# Patient Record
Sex: MALE | Race: WHITE | NOT HISPANIC OR LATINO | Employment: FULL TIME | URBAN - METROPOLITAN AREA
[De-identification: names, ages, dates, MRNs, and addresses within clinical notes are randomized per-mention and may not be internally consistent; named-entity substitution may affect disease eponyms.]

---

## 2022-10-28 ENCOUNTER — HOSPITAL ENCOUNTER (OUTPATIENT)
Dept: GENERAL RADIOLOGY | Facility: HOSPITAL | Age: 32
Discharge: HOME | End: 2022-10-28

## 2022-10-28 ENCOUNTER — LAB (OUTPATIENT)
Dept: LAB | Facility: HOSPITAL | Age: 32
End: 2022-10-28

## 2022-10-28 ENCOUNTER — OFFICE VISIT (OUTPATIENT)
Dept: INTERNAL MEDICINE | Facility: CLINIC | Age: 32
End: 2022-10-28

## 2022-10-28 VITALS
RESPIRATION RATE: 16 BRPM | OXYGEN SATURATION: 99 % | SYSTOLIC BLOOD PRESSURE: 140 MMHG | WEIGHT: 247 LBS | DIASTOLIC BLOOD PRESSURE: 92 MMHG | HEART RATE: 87 BPM | BODY MASS INDEX: 34.58 KG/M2 | HEIGHT: 71 IN | TEMPERATURE: 97.4 F

## 2022-10-28 DIAGNOSIS — N48.5 PENILE ULCER: ICD-10-CM

## 2022-10-28 DIAGNOSIS — Z13.6 SCREENING FOR ISCHEMIC HEART DISEASE: ICD-10-CM

## 2022-10-28 DIAGNOSIS — Z00.00 ROUTINE HEALTH MAINTENANCE: Primary | ICD-10-CM

## 2022-10-28 DIAGNOSIS — Z00.00 ROUTINE HEALTH MAINTENANCE: ICD-10-CM

## 2022-10-28 DIAGNOSIS — G89.29 CHRONIC LEFT SHOULDER PAIN: ICD-10-CM

## 2022-10-28 DIAGNOSIS — M25.512 CHRONIC LEFT SHOULDER PAIN: ICD-10-CM

## 2022-10-28 DIAGNOSIS — I10 PRIMARY HYPERTENSION: ICD-10-CM

## 2022-10-28 DIAGNOSIS — Z23 ENCOUNTER FOR VACCINATION: ICD-10-CM

## 2022-10-28 LAB
ALBUMIN SERPL-MCNC: 4.5 G/DL (ref 3.5–5.2)
ALBUMIN/GLOB SERPL: 1.5 G/DL
ALP SERPL-CCNC: 97 U/L (ref 39–117)
ALT SERPL W P-5'-P-CCNC: 58 U/L (ref 1–41)
ANION GAP SERPL CALCULATED.3IONS-SCNC: 10.5 MMOL/L (ref 5–15)
AST SERPL-CCNC: 28 U/L (ref 1–40)
BASOPHILS # BLD AUTO: 0.05 10*3/MM3 (ref 0–0.2)
BASOPHILS NFR BLD AUTO: 0.5 % (ref 0–1.5)
BILIRUB SERPL-MCNC: 1.4 MG/DL (ref 0–1.2)
BUN SERPL-MCNC: 8 MG/DL (ref 6–20)
BUN/CREAT SERPL: 8.8 (ref 7–25)
CALCIUM SPEC-SCNC: 9.2 MG/DL (ref 8.6–10.5)
CHLORIDE SERPL-SCNC: 103 MMOL/L (ref 98–107)
CHOLEST SERPL-MCNC: 177 MG/DL (ref 0–200)
CO2 SERPL-SCNC: 24.5 MMOL/L (ref 22–29)
CREAT SERPL-MCNC: 0.91 MG/DL (ref 0.76–1.27)
DEPRECATED RDW RBC AUTO: 41 FL (ref 37–54)
EGFRCR SERPLBLD CKD-EPI 2021: 114.8 ML/MIN/1.73
EOSINOPHIL # BLD AUTO: 0.15 10*3/MM3 (ref 0–0.4)
EOSINOPHIL NFR BLD AUTO: 1.5 % (ref 0.3–6.2)
ERYTHROCYTE [DISTWIDTH] IN BLOOD BY AUTOMATED COUNT: 12.6 % (ref 12.3–15.4)
GLOBULIN UR ELPH-MCNC: 3 GM/DL
GLUCOSE SERPL-MCNC: 74 MG/DL (ref 65–99)
HCT VFR BLD AUTO: 49.8 % (ref 37.5–51)
HDLC SERPL-MCNC: 29 MG/DL (ref 40–60)
HGB BLD-MCNC: 17 G/DL (ref 13–17.7)
IMM GRANULOCYTES # BLD AUTO: 0.05 10*3/MM3 (ref 0–0.05)
IMM GRANULOCYTES NFR BLD AUTO: 0.5 % (ref 0–0.5)
LDLC SERPL CALC-MCNC: 114 MG/DL (ref 0–100)
LDLC/HDLC SERPL: 3.79 {RATIO}
LYMPHOCYTES # BLD AUTO: 3.07 10*3/MM3 (ref 0.7–3.1)
LYMPHOCYTES NFR BLD AUTO: 30.8 % (ref 19.6–45.3)
MCH RBC QN AUTO: 30.1 PG (ref 26.6–33)
MCHC RBC AUTO-ENTMCNC: 34.1 G/DL (ref 31.5–35.7)
MCV RBC AUTO: 88.1 FL (ref 79–97)
MONOCYTES # BLD AUTO: 0.67 10*3/MM3 (ref 0.1–0.9)
MONOCYTES NFR BLD AUTO: 6.7 % (ref 5–12)
NEUTROPHILS NFR BLD AUTO: 5.98 10*3/MM3 (ref 1.7–7)
NEUTROPHILS NFR BLD AUTO: 60 % (ref 42.7–76)
NRBC BLD AUTO-RTO: 0 /100 WBC (ref 0–0.2)
PLATELET # BLD AUTO: 279 10*3/MM3 (ref 140–450)
PMV BLD AUTO: 12.1 FL (ref 6–12)
POTASSIUM SERPL-SCNC: 4.4 MMOL/L (ref 3.5–5.2)
PROT SERPL-MCNC: 7.5 G/DL (ref 6–8.5)
RBC # BLD AUTO: 5.65 10*6/MM3 (ref 4.14–5.8)
SODIUM SERPL-SCNC: 138 MMOL/L (ref 136–145)
TRIGL SERPL-MCNC: 190 MG/DL (ref 0–150)
VLDLC SERPL-MCNC: 34 MG/DL (ref 5–40)
WBC NRBC COR # BLD: 9.97 10*3/MM3 (ref 3.4–10.8)

## 2022-10-28 PROCEDURE — 86695 HERPES SIMPLEX TYPE 1 TEST: CPT

## 2022-10-28 PROCEDURE — 86696 HERPES SIMPLEX TYPE 2 TEST: CPT

## 2022-10-28 PROCEDURE — 73030 X-RAY EXAM OF SHOULDER: CPT

## 2022-10-28 PROCEDURE — 85025 COMPLETE CBC W/AUTO DIFF WBC: CPT

## 2022-10-28 PROCEDURE — 80061 LIPID PANEL: CPT

## 2022-10-28 PROCEDURE — 99204 OFFICE O/P NEW MOD 45 MIN: CPT | Performed by: STUDENT IN AN ORGANIZED HEALTH CARE EDUCATION/TRAINING PROGRAM

## 2022-10-28 PROCEDURE — 80053 COMPREHEN METABOLIC PANEL: CPT

## 2022-10-28 NOTE — PATIENT INSTRUCTIONS
Health Maintenance, Male  A healthy lifestyle and preventive care is important for your health and wellness. Ask your health care provider about what schedule of regular examinations is right for you.  What should I know about weight and diet?    Eat a Healthy Diet  Eat plenty of vegetables, fruits, whole grains, low-fat dairy products, and lean protein.  Do not eat a lot of foods high in solid fats, added sugars, or salt.     Maintain a Healthy Weight  Regular exercise can help you achieve or maintain a healthy weight. You should:  Do at least 150 minutes of exercise each week. The exercise should increase your heart rate and make you sweat (moderate-intensity exercise).  Do strength-training exercises at least twice a week.     Watch Your Levels of Cholesterol and Blood Lipids  Have your blood tested for lipids and cholesterol every 5 years starting at 35 years of age. If you are at high risk for heart disease, you should start having your blood tested when you are 20 years old. You may need to have your cholesterol levels checked more often if:  Your lipid or cholesterol levels are high.  You are older than 50 years of age.  You are at high risk for heart disease.     What should I know about cancer screening?  Many types of cancers can be detected early and may often be prevented.  Lung Cancer  You should be screened every year for lung cancer if:  You are a current smoker who has smoked for at least 30 years.  You are a former smoker who has quit within the past 15 years.  Talk to your health care provider about your screening options, when you should start screening, and how often you should be screened.     Colorectal Cancer  Routine colorectal cancer screening usually begins at 50 years of age and should be repeated every 5-10 years until you are 75 years old. You may need to be screened more often if early forms of precancerous polyps or small growths are found. Your health care provider may recommend  screening at an earlier age if you have risk factors for colon cancer.  Your health care provider may recommend using home test kits to check for hidden blood in the stool.  A small camera at the end of a tube can be used to examine your colon (sigmoidoscopy or colonoscopy). This checks for the earliest forms of colorectal cancer.     Prostate and Testicular Cancer  Depending on your age and overall health, your health care provider may do certain tests to screen for prostate and testicular cancer.  Talk to your health care provider about any symptoms or concerns you have about testicular or prostate cancer.     Skin Cancer  Check your skin from head to toe regularly.  Tell your health care provider about any new moles or changes in moles, especially if:  There is a change in a mole’s size, shape, or color.  You have a mole that is larger than a pencil eraser.  Always use sunscreen. Apply sunscreen liberally and repeat throughout the day.  Protect yourself by wearing long sleeves, pants, a wide-brimmed hat, and sunglasses when outside.     What should I know about heart disease, diabetes, and high blood pressure?  If you are 18-39 years of age, have your blood pressure checked every 3-5 years. If you are 40 years of age or older, have your blood pressure checked every year. You should have your blood pressure measured twice--once when you are at a hospital or clinic, and once when you are not at a hospital or clinic. Record the average of the two measurements. To check your blood pressure when you are not at a hospital or clinic, you can use:  An automated blood pressure machine at a pharmacy.  A home blood pressure monitor.  Talk to your health care provider about your target blood pressure.  If you are between 45-79 years old, ask your health care provider if you should take aspirin to prevent heart disease.  Have regular diabetes screenings by checking your fasting blood sugar level.  If you are at a normal  weight and have a low risk for diabetes, have this test once every three years after the age of 45.  If you are overweight and have a high risk for diabetes, consider being tested at a younger age or more often.  A one-time screening for abdominal aortic aneurysm (AAA) by ultrasound is recommended for men aged 65-75 years who are current or former smokers.  What should I know about preventing infection?  Hepatitis B  If you have a higher risk for hepatitis B, you should be screened for this virus. Talk with your health care provider to find out if you are at risk for hepatitis B infection.  Hepatitis C  Blood testing is recommended for:  Everyone born from 1945 through 1965.  Anyone with known risk factors for hepatitis C.     Sexually Transmitted Diseases (STDs)  You should be screened each year for STDs including gonorrhea and chlamydia if:  You are sexually active and are younger than 24 years of age.  You are older than 24 years of age and your health care provider tells you that you are at risk for this type of infection.  Your sexual activity has changed since you were last screened and you are at an increased risk for chlamydia or gonorrhea. Ask your health care provider if you are at risk.  Talk with your health care provider about whether you are at high risk of being infected with HIV. Your health care provider may recommend a prescription medicine to help prevent HIV infection.     What else can I do?    Schedule regular health, dental, and eye exams.  Stay current with your vaccines (immunizations).  Do not use any tobacco products, such as cigarettes, chewing tobacco, and e-cigarettes. If you need help quitting, ask your health care provider.  Limit alcohol intake to no more than 2 drinks per day. One drink equals 12 ounces of beer, 5 ounces of wine, or 1½ ounces of hard liquor.  Do not use street drugs.  Do not share needles.  Ask your health care provider for help if you need support or information  about quitting drugs.  Tell your health care provider if you often feel depressed.  Tell your health care provider if you have ever been abused or do not feel safe at home.      This information is not intended to replace advice given to you by your health care provider. Make sure you discuss any questions you have with your health care provider.  Document Released: 06/15/2009 Document Revised: 08/16/2017 Document Reviewed: 09/20/2016  [employer] Patient Education © 2018 [employer]    Healthy Eating  Following a healthy eating pattern may help you to achieve and maintain a healthy body weight, reduce the risk of chronic disease, and live a long and productive life. It is important to follow a healthy eating pattern at an appropriate calorie level for your body. Your nutritional needs should be met primarily through food by choosing a variety of nutrient-rich foods.  What are tips for following this plan?  Reading food labels  Read labels and choose the following:  Reduced or low sodium.  Juices with 100% fruit juice.  Foods with low saturated fats and high polyunsaturated and monounsaturated fats.  Foods with whole grains, such as whole wheat, cracked wheat, brown rice, and wild rice.  Whole grains that are fortified with folic acid. This is recommended for women who are pregnant or who want to become pregnant.  Read labels and avoid the following:  Foods with a lot of added sugars. These include foods that contain brown sugar, corn sweetener, corn syrup, dextrose, fructose, glucose, high-fructose corn syrup, honey, invert sugar, lactose, malt syrup, maltose, molasses, raw sugar, sucrose, trehalose, or turbinado sugar.  Do not eat more than the following amounts of added sugar per day:  6 teaspoons (25 g) for women.  9 teaspoons (38 g) for men.  Foods that contain processed or refined starches and grains.  Refined grain products, such as white flour, degermed cornmeal, white bread, and white  "rice.  Shopping  Choose nutrient-rich snacks, such as vegetables, whole fruits, and nuts. Avoid high-calorie and high-sugar snacks, such as potato chips, fruit snacks, and candy.  Use oil-based dressings and spreads on foods instead of solid fats such as butter, stick margarine, or cream cheese.  Limit pre-made sauces, mixes, and \"instant\" products such as flavored rice, instant noodles, and ready-made pasta.  Try more plant-protein sources, such as tofu, tempeh, black beans, edamame, lentils, nuts, and seeds.  Explore eating plans such as the Mediterranean diet or vegetarian diet.  Cooking  Use oil to sauté or stir-khoury foods instead of solid fats such as butter, stick margarine, or lard.  Try baking, boiling, grilling, or broiling instead of frying.  Remove the fatty part of meats before cooking.  Steam vegetables in water or broth.  Meal planning    At meals, imagine dividing your plate into fourths:  One-half of your plate is fruits and vegetables.  One-fourth of your plate is whole grains.  One-fourth of your plate is protein, especially lean meats, poultry, eggs, tofu, beans, or nuts.  Include low-fat dairy as part of your daily diet.     Lifestyle  Choose healthy options in all settings, including home, work, school, restaurants, or stores.  Prepare your food safely:  Wash your hands after handling raw meats.  Keep food preparation surfaces clean by regularly washing with hot, soapy water.  Keep raw meats separate from ready-to-eat foods, such as fruits and vegetables.  , meat, poultry, and eggs to the recommended internal temperature.  Store foods at safe temperatures. In general:  Keep cold foods at 40°F (4.4°C) or below.  Keep hot foods at 140°F (60°C) or above.  Keep your freezer at 0°F (-17.8°C) or below.  Foods are no longer safe to eat when they have been between the temperatures of 40°-140°F (4.4-60°C) for more than 2 hours.  What foods should I eat?  Fruits  Aim to eat 2 cup-equivalents of " fresh, canned (in natural juice), or frozen fruits each day. Examples of 1 cup-equivalent of fruit include 1 small apple, 8 large strawberries, 1 cup canned fruit, ½ cup dried fruit, or 1 cup 100% juice.  Vegetables  Aim to eat 2½-3 cup-equivalents of fresh and frozen vegetables each day, including different varieties and colors. Examples of 1 cup-equivalent of vegetables include 2 medium carrots, 2 cups raw, leafy greens, 1 cup chopped vegetable (raw or cooked), or 1 medium baked potato.  Grains  Aim to eat 6 ounce-equivalents of whole grains each day. Examples of 1 ounce-equivalent of grains include 1 slice of bread, 1 cup ready-to-eat cereal, 3 cups popcorn, or ½ cup cooked rice, pasta, or cereal.  Meats and other proteins  Aim to eat 5-6 ounce-equivalents of protein each day. Examples of 1 ounce-equivalent of protein include 1 egg, 1/2 cup nuts or seeds, or 1 tablespoon (16 g) peanut butter. A cut of meat or fish that is the size of a deck of cards is about 3-4 ounce-equivalents.  Of the protein you eat each week, try to have at least 8 ounces come from seafood. This includes salmon, trout, herring, and anchovies.  Dairy  Aim to eat 3 cup-equivalents of fat-free or low-fat dairy each day. Examples of 1 cup-equivalent of dairy include 1 cup (240 mL) milk, 8 ounces (250 g) yogurt, 1½ ounces (44 g) natural cheese, or 1 cup (240 mL) fortified soy milk.  Fats and oils  Aim for about 5 teaspoons (21 g) per day. Choose monounsaturated fats, such as canola and olive oils, avocados, peanut butter, and most nuts, or polyunsaturated fats, such as sunflower, corn, and soybean oils, walnuts, pine nuts, sesame seeds, sunflower seeds, and flaxseed.  Beverages  Aim for six 8-oz glasses of water per day. Limit coffee to three to five 8-oz cups per day.  Limit caffeinated beverages that have added calories, such as soda and energy drinks.  Limit alcohol intake to no more than 1 drink a day for nonpregnant women and 2 drinks a day  for men. One drink equals 12 oz of beer (355 mL), 5 oz of wine (148 mL), or 1½ oz of hard liquor (44 mL).  Seasoning and other foods  Avoid adding excess amounts of salt to your foods. Try flavoring foods with herbs and spices instead of salt.  Avoid adding sugar to foods.  Try using oil-based dressings, sauces, and spreads instead of solid fats.  This information is based on general [place] nutrition guidelines. For more information, visit choosemyplate.gov. Exact amounts may vary based on your nutrition needs.  Summary  A healthy eating plan may help you to maintain a healthy weight, reduce the risk of chronic diseases, and stay active throughout your life.  Plan your meals. Make sure you eat the right portions of a variety of nutrient-rich foods.  Try baking, boiling, grilling, or broiling instead of frying.  Choose healthy options in all settings, including home, work, school, restaurants, or stores.  This information is not intended to replace advice given to you by your health care provider. Make sure you discuss any questions you have with your health care provider.  Document Revised: 04/01/2019 Document Reviewed: 04/01/2019  [employer] Patient Education © 2021 [employer]    Exercising to Stay Healthy  To become healthy and stay healthy, it is recommended that you do moderate-intensity and vigorous-intensity exercise. You can tell that you are exercising at a moderate intensity if your heart starts beating faster and you start breathing faster but can still hold a conversation. You can tell that you are exercising at a vigorous intensity if you are breathing much harder and faster and cannot hold a conversation while exercising.  Exercising regularly is important. It has many health benefits, such as:  Improving overall fitness, flexibility, and endurance.  Increasing bone density.  Helping with weight control.  Decreasing body fat.  Increasing muscle strength.  Reducing stress and tension.  Improving overall  health.  How often should I exercise?  Choose an activity that you enjoy, and set realistic goals. Your health care provider can help you make an activity plan that works for you.  Exercise regularly as told by your health care provider. This may include:  Doing strength training two times a week, such as:  Lifting weights.  Using resistance bands.  Push-ups.  Sit-ups.  Yoga.  Doing a certain intensity of exercise for a given amount of time. Choose from these options:  A total of 150 minutes of moderate-intensity exercise every week.  A total of 75 minutes of vigorous-intensity exercise every week.  A mix of moderate-intensity and vigorous-intensity exercise every week.  Children, pregnant women, people who have not exercised regularly, people who are overweight, and older adults may need to talk with a health care provider about what activities are safe to do. If you have a medical condition, be sure to talk with your health care provider before you start a new exercise program.  What are some exercise ideas?    Moderate-intensity exercise ideas include:  Walking 1 mile (1.6 km) in about 15 minutes.  Biking.  Hiking.  Golfing.  Dancing.  Water aerobics.  Vigorous-intensity exercise ideas include:  Walking 4.5 miles (7.2 km) or more in about 1 hour.  Jogging or running 5 miles (8 km) in about 1 hour.  Biking 10 miles (16.1 km) or more in about 1 hour.  Lap swimming.  Roller-skating or in-line skating.  Cross-country skiing.  Vigorous competitive sports, such as football, basketball, and soccer.  Jumping rope.  Aerobic dancing.  What are some everyday activities that can help me to get exercise?  Yard work, such as:  Pushing a .  Raking and bagging leaves.  Washing your car.  Pushing a stroller.  Shoveling snow.  Gardening.  Washing windows or floors.  How can I be more active in my day-to-day activities?  Use stairs instead of an elevator.  Take a walk during your lunch break.  If you drive, park your car  farther away from your work or school.  If you take public transportation, get off one stop early and walk the rest of the way.  Stand up or walk around during all of your indoor phone calls.  Get up, stretch, and walk around every 30 minutes throughout the day.  Enjoy exercise with a friend. Support to continue exercising will help you keep a regular routine of activity.  What guidelines can I follow while exercising?  Before you start a new exercise program, talk with your health care provider.  Do not exercise so much that you hurt yourself, feel dizzy, or get very short of breath.  Wear comfortable clothes and wear shoes with good support.  Drink plenty of water while you exercise to prevent dehydration or heat stroke.  Work out until your breathing and your heartbeat get faster.  Where to find more information  [place] Department of Health and Human Services: www.hhs.gov  Centers for Disease Control and Prevention (CDC): www.cdc.gov  Summary  Exercising regularly is important. It will improve your overall fitness, flexibility, and endurance.  Regular exercise also will improve your overall health. It can help you control your weight, reduce stress, and improve your bone density.  Do not exercise so much that you hurt yourself, feel dizzy, or get very short of breath.  Before you start a new exercise program, talk with your health care provider.  This information is not intended to replace advice given to you by your health care provider. Make sure you discuss any questions you have with your health care provider.  Document Revised: 11/30/2018 Document Reviewed: 11/08/2018  [employer] Patient Education © 2021 [employer]    Safe Sex  Practicing safe sex means taking steps before and during sex to reduce your risk of:  Getting an STI (sexually transmitted infection).  Giving your partner an STI.  Unwanted or unplanned pregnancy.  How to practice safe sex  Ways you can practice safe sex    Limit your sexual partners  to only one partner who is having sex with only you.  Avoid using alcohol and drugs before having sex. Alcohol and drugs can affect your judgment.  Before having sex with a new partner:  Talk to your partner about past partners, past STIs, and drug use.  Get screened for STIs and discuss the results with your partner. Ask your partner to get screened too.  Check your body regularly for sores, blisters, rashes, or unusual discharge. If you notice any of these problems, visit your health care provider.  Avoid sexual contact if you have symptoms of an infection or you are being treated for an STI.  While having sex, use a condom. Make sure to:  Use a condom every time you have vaginal, oral, or anal sex. Both females and males should wear condoms during oral sex.  Keep condoms in place from the beginning to the end of sexual activity.  Use a latex condom, if possible. Latex condoms offer the best protection.  Use only water-based lubricants with a condom. Using petroleum-based lubricants or oils will weaken the condom and increase the chance that it will break.     Ways your health care provider can help you practice safe sex    See your health care provider for regular screenings, exams, and tests for STIs.  Talk with your health care provider about what kind of birth control (contraception) is best for you.  Get vaccinated against hepatitis B and human papillomavirus (HPV).  If you are at risk of being infected with HIV (human immunodeficiency virus), talk with your health care provider about taking a prescription medicine to prevent HIV infection. You are at risk for HIV if you:  Are a man who has sex with other men.  Are sexually active with more than one partner.  Take drugs by injection.  Have a sex partner who has HIV.  Have unprotected sex.  Have sex with someone who has sex with both men and women.  Have had an STI.     Follow these instructions at home:  Take over-the-counter and prescription medicines only  as told by your health care provider.  Keep all follow-up visits. This is important.  Where to find more information  Centers for Disease Control and Prevention: www.cdc.gov  Planned Parenthood: www.plannedparenthood.org  Office on Women's Health: www.womenshealth.gov  Summary  Practicing safe sex means taking steps before and during sex to reduce your risk getting an STI, giving your partner an STI, and having an unwanted or unplanned pregnancy.  Before having sex with a new partner, talk to your partner about past partners, past STIs, and drug use.  Use a condom every time you have vaginal, oral, or anal sex. Both females and males should wear condoms during oral sex.  Check your body regularly for sores, blisters, rashes, or unusual discharge. If you notice any of these problems, visit your health care provider.  See your health care provider for regular screenings, exams, and tests for STIs.  This information is not intended to replace advice given to you by your health care provider. Make sure you discuss any questions you have with your health care provider.  Document Revised: 05/24/2021 Document Reviewed: 05/24/2021  [employer] Patient Education © 2021 [employer]          Healthy Delicious Recipes from [employer]: https://[employer].org/  [place] Plant Based Meal Recipes: https://Language Learning Class/  Heart Healthy Recipes from [employer]: https://abcardio.org/wp-content/uploads/2020/06/ABC_Cookbook.pdf  [employer] Healthy Living Guide: https://www.\[facility] [place] [facility]\"".[place].edu/nutritionsource/2022/01/06/healthy-living-guide-3846-9595/  SNAP Cookbook for Budgets: http://[employer]otechnologies.net/dss/documents/good-and-cheap.pdf

## 2022-10-28 NOTE — PROGRESS NOTES
New Patient Office Visit      Date: 10/28/2022   Patient Name: Lorenzo Cortes  : 1990   MRN: [id number]     Chief Complaint:    Chief Complaint   Patient presents with   • Establish Care   • Skin Lesion     Penile lesion   • Shoulder Pain     Lateral LT shoulder pain, numbness       History of Present Illness: Lorenzo Cortes is a 32 y.o. male who is here today to establish care.    HPI   Patient was seen in urgent care on 2022 for penile lesion.  Had HSV 1 and 2 serum antibody testing and viral culture of the lesion which were negative.  Per review these labs were obtained at [facility], HSV culture negative on 2022, chlamydia and gonorrhea urine testing negative on 2022, RPR, hepatitis C, hepatitis B and HIV all negative on 2022 as well.  Denies known exposure to STIs.  No recent partners.  Reports he had another lesion appear which she thought was an ingrown hair, which resolved within 2 days.  Is interested in repeat testing for HSV      Erectile dysfunction  Has been prescribed sildenafil in the past. Works well. Not using regularly.     Elevated liver enzymes  Per review of documentation patient has a history of elevated liver enzymes, had been advised to limit alcohol.    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  Was on medication in the past, doesn't remember medication. Was 10 years ago.  Checks at [facility] occasoinally typically 130-140/85--90.  Denies chest pain, palpitations, shortness of breath.      Cigarette Smoking  Previously smoked (17 yrs) 1ppd. Previously tried chantix.   Used nicotine gum in the past.   Currently doing fume essentials. (vaporizing no nicotine supplement)    Buldging discs  L3-L4, L5-S1.     Left shoulder pain  Patient reports he previously had a left clavicle injury following a fall in the past.  This healed and he had no residual left shoulder pain.  Over the prior 1 to 2 months he has had aching left  shoulder pain with intermittent popping.  Patient will have tingling sensation down the lateral aspect of his arm.  No weakness or numbness.  Not taking anything for the pain.    Subjective      Review of Systems:   Review of Systems    Past Medical History: History reviewed. No pertinent past medical history.    Past Surgical History:   Past Surgical History:   Procedure Laterality Date   • HERNIA REPAIR     • WISDOM TOOTH EXTRACTION         Family History:   Family History   Problem Relation Age of Onset   • Cancer Maternal Grandmother    • Cancer Maternal Grandfather    • Cancer Paternal Grandmother        Social History:   Social History     Socioeconomic History   • Marital status: Single   Tobacco Use   • Smoking status: Some Days     Types: Electronic Cigarette   • Smokeless tobacco: Never   • Tobacco comments:     I have been on and off trying to quit for about 3 months. I have a plan in place but everyone falls.   Vaping Use   • Vaping Use: Never used   Substance and Sexual Activity   • Alcohol use: Not Currently     Comment: I drink once every month of two generally   • Drug use: Yes     Frequency: 7.0 times per week     Types: Marijuana   • Sexual activity: Yes     Partners: Female     Birth control/protection: Condom       Medications:     Current Outpatient Medications:   •  fexofenadine-pseudoephedrine (ALLEGRA-D 24) 180-240 MG per 24 hr tablet, Take 1 tablet by mouth Daily As Needed for Allergies or Rhinitis., Disp: 20 tablet, Rfl: 0  •  fluticasone (FLONASE) 50 MCG/ACT nasal spray, fluticasone propionate 50 mcg/actuation nasal spray,suspension  Spray 1 spray every day by intranasal route., Disp: , Rfl:   •  sildenafil (VIAGRA) 50 MG tablet, sildenafil 50 mg tablet  Take 1 tablet every day by oral route as needed., Disp: , Rfl:   •  azelastine (ASTELIN) 0.1 % nasal spray, Every 12 (Twelve) Hours., Disp: , Rfl:     Allergies:   Allergies   Allergen Reactions   • Amoxicillin Hives   • Azithromycin Hives  "  • Cefaclor Hives       Immunizations:  Td/Tdap(Booster Q 10 yrs): 2012 ,refuses  Flu (Yearly):  refuses  Pneumonia: NA  Immunization History   Administered Date(s) Administered   • COVID-19 (PFIZER) PURPLE CAP 03/29/2021, 04/24/2021        Colorectal Screening:   Na  Last Completed Colonoscopy     This patient has no relevant Health Maintenance data.        Hep C (Age 18-79 once):  negative    A1c: NA  Lipid panel: due  The ASCVD Risk score (Giuliano GUZMAN, et al., 2019) failed to calculate for the following reasons:    The 2019 ASCVD risk score is only valid for ages 40 to 79    Ophthalmologist: regularly  Dentist: occasionally every 1-2 years.    Tobacco Use: High Risk   • Smoking Tobacco Use: Some Days   • Smokeless Tobacco Use: Never   • Passive Exposure: Not on file       Social History     Substance and Sexual Activity   Alcohol Use Not Currently    Comment: I drink once every month of two generally        Social History     Substance and Sexual Activity   Drug Use Yes   • Frequency: 7.0 times per week   • Types: Marijuana        Diet/Physical activity:   Patient reports he previously was exercising regularly running 2 to 4 miles 5 times per week.  He has not done this for the past 1 to 2 months, but is working on getting back to increasing his physical activity.  He generally eats a healthy diet including chicken, vegetables, fruits, whole grains.  He does note that he likely eats larger portions than needed and does occasionally snack on sweets.  He does not drink any sodas.    Sexual health: Not currently active, interested in females, condom contraception used    PHQ-2 Depression Screening  PHQ-9 Total Score: 0         Objective     Physical Exam:  Vital Signs:   Vitals:    10/28/22 0921   BP: 140/92   Pulse: 87   Resp: 16   Temp: 97.4 °F (36.3 °C)   TempSrc: Infrared   SpO2: 99%   Weight: 112 kg (247 lb)   Height: 180.3 cm (71\")   PainSc: 0-No pain     Body mass index is 34.45 kg/m².    Physical Exam  Vitals " reviewed.   Constitutional:       General: He is not in acute distress.     Appearance: Normal appearance. He is obese. He is not ill-appearing or toxic-appearing.   HENT:      Head: Normocephalic and atraumatic.      Right Ear: External ear normal.      Left Ear: External ear normal.      Nose: Nose normal. No congestion.      Mouth/Throat:      Mouth: Mucous membranes are moist.      Pharynx: No oropharyngeal exudate or posterior oropharyngeal erythema.   Eyes:      General: No scleral icterus.        Right eye: No discharge.         Left eye: No discharge.      Extraocular Movements: Extraocular movements intact.      Pupils: Pupils are equal, round, and reactive to light.   Neck:      Comments: Tight cervical paraspinal musculature, left greater than right  Cardiovascular:      Rate and Rhythm: Normal rate and regular rhythm.      Pulses: Normal pulses.      Heart sounds: Normal heart sounds. No murmur heard.    No friction rub. No gallop.   Pulmonary:      Effort: Pulmonary effort is normal. No respiratory distress.      Breath sounds: Normal breath sounds. No stridor. No wheezing, rhonchi or rales.   Abdominal:      General: Abdomen is flat. Bowel sounds are normal. There is no distension.      Palpations: Abdomen is soft. There is no mass.      Tenderness: There is no abdominal tenderness. There is no guarding.      Hernia: No hernia is present.   Genitourinary:     Comments: Patient deferred  Musculoskeletal:         General: Tenderness (Mild tenderness over left AC joint and lateral shoulder.) present. No swelling or deformity. Normal range of motion.      Cervical back: Normal range of motion. No rigidity or tenderness.      Comments: Occasional clicking with abduction of the left shoulder.  Negative Marquez, negative Neer   Lymphadenopathy:      Cervical: No cervical adenopathy.   Skin:     General: Skin is warm and dry.      Capillary Refill: Capillary refill takes less than 2 seconds.      Coloration:  Skin is not jaundiced or pale.   Neurological:      General: No focal deficit present.      Mental Status: He is alert and oriented to person, place, and time. Mental status is at baseline.   Psychiatric:         Mood and Affect: Mood normal.         Behavior: Behavior normal.         Judgment: Judgment normal.         Procedures    Results:     Labs:   No results found for: HGBA1C, CMP, CBCDIFFPANEL, CREAT, TSH     Imaging:   No valid procedures specified.     Assessment / Plan      Assessment/Plan:   Problem List Items Addressed This Visit        Cardiac and Vasculature    Hypertension    Overview     Resolved with prior weight loss            Genitourinary and Reproductive     Penile ulcer    Overview     Late August had negative STI testing including chlamydia, gonorrhea, RPR, HIV, hep C, hep B, HSV culture and HSV IgM and IgG antibodies         Current Assessment & Plan     Patient believes this was secondary to ingrown hair, defers examination today.  Is interested in repeating HSV antibodies to ensure there is not delayed serologic response.         Relevant Orders    HSV 1 & 2 - Specific Antibody, IgG       Musculoskeletal and Injuries    Chronic left shoulder pain    Overview     Prior left clavicle injury         Current Assessment & Plan     Pain consistent with AC joint arthritis or possible labral tear.  Will obtain plain films today.  Physical therapy referral provided.  Will consider MRI in the future if no improvement.         Relevant Orders    XR Shoulder 2+ View Left (Completed)    Ambulatory Referral to Physical Therapy Evaluate and treat   Other Visit Diagnoses     Routine health maintenance    -  Primary    Relevant Orders    CBC Auto Differential    Comprehensive Metabolic Panel    Encounter for vaccination        Screening for ischemic heart disease        Relevant Orders    Lipid Panel          Healthcare Maintenance:  Counseling provided based on age appropriate USPSTF guidelines.  BMI is  >= 30 and <35. (Class 1 Obesity). The following options were offered after discussion;: weight loss educational material (shared in after visit summary), exercise counseling/recommendations and nutrition counseling/recommendations    Lorenzo Cortes voices understanding and acceptance of this advice and will call back with any further questions or concerns. AVS with preventive healthcare tips printed for patient.     “Discussed risks/benefits to vaccination, reviewed components of the vaccine, discussed VIS, discussed informed consent, informed consent obtained. Patient/Parent was allowed to accept or refuse vaccine. Questions answered to satisfactory state of patient/Parent. We reviewed typical age appropriate and seasonally appropriate vaccinations. Reviewed immunization history and updated state vaccination form as needed. Patient was counseled on Influenza  Tdap refused      Follow Up:   Return in about 3 months (around 1/28/2023) for Recheck weight and blood pressure.    I spent 47 minutes caring for Lorenzo on this date of service. This time includes time spent by me in the following activities: preparing for the visit, obtaining and/or reviewing a separately obtained history, counseling and educating the patient/family/caregiver, ordering medications, tests, or procedures and documenting information in the medical record        Chava Ramos MD   [facility] PAMELA Nicholas

## 2022-10-28 NOTE — ASSESSMENT & PLAN NOTE
Pain consistent with AC joint arthritis or possible labral tear.  Will obtain plain films today.  Physical therapy referral provided.  Will consider MRI in the future if no improvement.

## 2022-10-28 NOTE — ASSESSMENT & PLAN NOTE
Patient believes this was secondary to ingrown hair, defers examination today.  Is interested in repeating HSV antibodies to ensure there is not delayed serologic response.

## 2022-10-29 LAB
HSV1 IGG SER IA-ACNC: <0.91 INDEX (ref 0–0.9)
HSV2 IGG SER IA-ACNC: 7.99 INDEX (ref 0–0.9)

## 2022-12-01 ENCOUNTER — TREATMENT (OUTPATIENT)
Dept: PHYSICAL THERAPY | Facility: CLINIC | Age: 32
End: 2022-12-01

## 2022-12-01 ENCOUNTER — TELEPHONE (OUTPATIENT)
Dept: INTERNAL MEDICINE | Facility: CLINIC | Age: 32
End: 2022-12-01

## 2022-12-01 DIAGNOSIS — M25.512 CHRONIC LEFT SHOULDER PAIN: Primary | ICD-10-CM

## 2022-12-01 DIAGNOSIS — G89.29 CHRONIC LEFT SHOULDER PAIN: Primary | ICD-10-CM

## 2022-12-01 DIAGNOSIS — A60.01 HERPES SIMPLEX INFECTION OF PENIS: Primary | ICD-10-CM

## 2022-12-01 DIAGNOSIS — F17.200 TOBACCO DEPENDENCE: ICD-10-CM

## 2022-12-01 PROCEDURE — 97140 MANUAL THERAPY 1/> REGIONS: CPT | Performed by: PHYSICAL THERAPIST

## 2022-12-01 PROCEDURE — 97162 PT EVAL MOD COMPLEX 30 MIN: CPT | Performed by: PHYSICAL THERAPIST

## 2022-12-01 PROCEDURE — 97110 THERAPEUTIC EXERCISES: CPT | Performed by: PHYSICAL THERAPIST

## 2022-12-01 NOTE — PROGRESS NOTES
Physical Therapy Initial Evaluation and Plan of Care  [address]  [place] [address]    Patient: Lorenzo Cortes   : 1990  Diagnosis/ICD-10 Code:  No primary diagnosis found.  Referring practitioner: Chava Ramos MD  Date of Initial Visit: 2022  Today's Date: 2022  Patient seen for Visit count could not be calculated. Make sure you are using a visit which is associated with an episode. session         Visit Diagnoses:  No diagnosis found.      Subjective Questionnaire: QuickDASH: 9.09      Subjective Evaluation    History of Present Illness  Mechanism of injury: Pt is a 32 year old male presenting to the clinic with left shoulder pain. 2 years ago he was sitting at his desk and felt an intense pop in his clavicle. There was no trauma to the area and he is unsure of whyHe had an injury around 2 years ago and did PT for 6 weeks. He felt good for a few months, but then the pain started back again. He tried to do the exercises for awhile which helped, but then he stopped doing them. Around 4 months ago, he started noticing popping in her left shoulder. Currently he is having pain with certain motions. He feels like he has lost stability. He will sometimes get a pop in his shoulder and then his arm will go limp for a few seconds and then it will go back to normal. He has some mild tingling in the upper shoulder. When he was 12 he  the AC joint and he also had a broken collar bone when he was 8. He is unsure of which side.       Patient Occupation:   Quality of life: excellent    Pain  Current pain ratin  At worst pain ratin  Quality: discomfort  Aggravating factors: outstretched reach and lifting    Hand dominance: right    Diagnostic Tests  X-ray: normal    Patient Goals  Patient goals for therapy: decreased pain, increased motion, independence with ADLs/IADLs, return to sport/leisure activities and increased strength              Objective          Tenderness     Left Shoulder   Tenderness in the AC joint and scapular spine.     Active Range of Motion   Left Shoulder   Flexion: 175 degrees   Abduction: 160 degrees     Right Shoulder   Flexion: 177 degrees   Abduction: 170 degrees     Additional Active Range of Motion Details  Cervical ext reproduced tingling in the left fingers  Apleys ER right: T2  Apleys ER left: T2  Apleys IR right: T8  Apleys IR left: T8    Strength/Myotome Testing     Left Shoulder     Planes of Motion   Flexion: 4   Abduction: 4-   External rotation at 0°: 4   Internal rotation at 0°: 4+     Isolated Muscles   Biceps: 5   Triceps: 5     Right Shoulder     Planes of Motion   Flexion: 4+   Abduction: 4+   External rotation at 0°: 5   Internal rotation at 0°: 5     Isolated Muscles   Biceps: 5   Triceps: 5     Tests   Cervical     Left   Positive active compression (Parker).     Left Shoulder   Positive Speed's.           Assessment & Plan     Assessment  Impairments: activity intolerance, impaired physical strength, lacks appropriate home exercise program and pain with function  Functional Limitations: lifting, uncomfortable because of pain, reaching behind back and reaching overhead  Assessment details: Pt is a 32 year old male presenting to the clinic with left shoulder pain. He demonstrates decreased strength, tenderness of the scapular spine and AC joint, and positive Speeds and Obriens testing. His impairments are limiting his ability to reach out to the side and lift without pain and popping. Pt would benefit from skilled PT services to address his impairments so he can reach his long term goals.   Prognosis: good    Goals  Plan Goals: SHORT TERM GOALS:     2 weeks  1. Pt I w/ HEP  2. Pt to demonstrate full AROM of the left shoulder with 0/10 pain.  3. Pt to demonstrate ability to perform 30 minutes continuous activity in the clinic without increase in pain     LONG TERM GOALS:   6 weeks  1. Pt to  demonstrate 5/5 strength of the left shoulder in order to improve stability with lifting.  2. Pt to demonstrate ability to lift 5# OH with the left arm without increase in pain  3. Pt to tolerate 60 minutes continuous activity in the clinic without increase in pain     Plan  Therapy options: will be seen for skilled therapy services  Planned modality interventions: cryotherapy, dry needling, electrical stimulation/Russian stimulation, high voltage pulsed current (pain management), TENS and iontophoresis  Planned therapy interventions: manual therapy, neuromuscular re-education, postural training, soft tissue mobilization, spinal/joint mobilization, stretching, therapeutic activities, home exercise program, functional ROM exercises and body mechanics training  Duration in visits: 1  Duration in weeks: 12  Treatment plan discussed with: patient        History # of Personal Factors and/or Comorbidities: MODERATE (1-2)  Examination of Body System(s): # of elements: MODERATE (3)  Clinical Presentation: STABLE   Clinical Decision Making: MODERATE      Timed:         Manual Therapy:    10     mins  57039;     Therapeutic Exercise:    15     mins  09562;     Neuromuscular Anders:        mins  84991;    Therapeutic Activity:          mins  35435;     Gait Training:           mins  21510;     Ultrasound:         mins  26740;    Ionto                                   mins   84321  Self Care                            mins   50917  Canalith Repos         mins 75779      Un-Timed:  Electrical Stimulation:         mins  13587 ( );  Dry Needling          mins self-pay  Traction          mins 59570  Low Eval          Mins  37614  Mod Eval     30     Mins  06732  High Eval                            Mins  18634        Timed Treatment:   25   mins   Total Treatment:     55   mins          PT: Haley Bolanos PT   License Number: [id number]  Electronically signed by Haley Bolanos PT, 12/01/22, 8:32 AM  EST    Certification Period: 12/1/2022 thru 2/28/2023  I certify that the therapy services are furnished while this patient is under my care.  The services outlined above are required by this patient, and will be reviewed every 90 days.         Physician Signature:__________________________________________________    PHYSICIAN: Chava Ramos MD  NPI: [id number]                                      DATE:      Please sign and return via fax to [email or web address] . Thank you, Psychiatric Physical Therapy.

## 2022-12-01 NOTE — TELEPHONE ENCOUNTER
Caller: Lorenzo Cortes    Relationship: Self    Best call back number: [phone]    What is the best time to reach you: ANY    Who are you requesting to speak with (clinical staff, provider,  specific staff member): DR. KINNEY    What was the call regarding: PATIENT HAS QUESTIONS ABOUT VARIOUS MEDICATIONS. HE IS TRACKING THAT HE HSV 2 POSITIVE HE IS HAVING A FLAIR UP. HIS LAST PRESCRIPTION FOR THIS VALTREX. HE IS WANTING TO KNOW IF THIS IS A GOOD MEDICATION TO CONTINUE OR IF THERE ARE BETTER OPTIONS FOR HIS TREATMENT. PATIENT IS ALSO WANTING TO DISCUSS TAKING WELLBUTRIN TO HELP HIM QUIT SMOKING FOR A MONTH. PLEASE CALL PATIENT BACK AND IF HE IS NEEDING TO BE SEEN LET HIM KNOW.     Do you require a callback: YES, ASAP. IF NOT ANSWERED PLEASE LEAVE A VOICEMAIL.

## 2022-12-02 ENCOUNTER — TELEPHONE (OUTPATIENT)
Dept: INTERNAL MEDICINE | Facility: CLINIC | Age: 32
End: 2022-12-02

## 2022-12-02 RX ORDER — BUPROPION HYDROCHLORIDE 150 MG/1
150 TABLET ORAL DAILY
Qty: 60 TABLET | Refills: 0 | Status: SHIPPED | OUTPATIENT
Start: 2022-12-02

## 2022-12-02 RX ORDER — VALACYCLOVIR HYDROCHLORIDE 1 G/1
1000 TABLET, FILM COATED ORAL 2 TIMES DAILY
Qty: 14 TABLET | Refills: 0 | Status: SHIPPED | OUTPATIENT
Start: 2022-12-02 | End: 2022-12-09

## 2022-12-02 NOTE — TELEPHONE ENCOUNTER
Called patient to discuss recent genital herpes outbreak.  Will prescribe Valtrex.  Patient also having difficulty with smoking cessation, interested in starting Wellbutrin.  Prescription provided.    Dr. Ramos

## 2022-12-02 NOTE — TELEPHONE ENCOUNTER
SHARI FROM [facility] CALLED FOR CLARIFICATION ON A MEDICATION SENT IN TODAY.  IT WAS FOR THE WELLBUTRIN... SHE ASKS THAT SOMEONE CALL AS SOON AS POSSIBLE.  THANK YOU

## 2022-12-07 ENCOUNTER — TREATMENT (OUTPATIENT)
Dept: PHYSICAL THERAPY | Facility: CLINIC | Age: 32
End: 2022-12-07

## 2022-12-07 DIAGNOSIS — M25.512 CHRONIC LEFT SHOULDER PAIN: Primary | ICD-10-CM

## 2022-12-07 DIAGNOSIS — G89.29 CHRONIC LEFT SHOULDER PAIN: Primary | ICD-10-CM

## 2022-12-07 PROCEDURE — 97110 THERAPEUTIC EXERCISES: CPT | Performed by: PHYSICAL THERAPIST

## 2022-12-07 PROCEDURE — 97140 MANUAL THERAPY 1/> REGIONS: CPT | Performed by: PHYSICAL THERAPIST

## 2022-12-07 NOTE — PROGRESS NOTES
Physical Therapy Daily Progress Note    Subjective   Lorenzo Cortes reports: he got a massage 2 days ago which really helped loosen up his shoulder. The home exercises are going fine and do not cause pain.      Objective   See Exercise, Manual, and Modality Logs for complete treatment.       Assessment/Plan   Pt tolerated treatment well. He required cues to slightly protract his shoulder with wall washes. He was given an updated HEP. Pt would benefit from continued skilled PT.     Progress per Plan of Care           Manual Therapy:    10     mins  28726;  Therapeutic Exercise:    13     mins  86476;     Neuromuscular Anders:        mins  49748;    Therapeutic Activity:          mins  82086;     Gait Training:           mins  51852;     Ultrasound:          mins  88633;    Electrical Stimulation:         mins  72423 ( );  E-Stim Attended:         mins  61394  Iontophoresis          mins 14948   Traction          mins  43558  Fluidotherapy          mins  39896  Dry Needling          mins self-pay - No Charge  Paraffin          mins  83617    Timed Treatment:   23   mins   Total Treatment:     49   mins    Haley Bolanos, PT, DPT  Physical Therapist

## 2022-12-14 ENCOUNTER — TREATMENT (OUTPATIENT)
Dept: PHYSICAL THERAPY | Facility: CLINIC | Age: 32
End: 2022-12-14

## 2022-12-14 DIAGNOSIS — M25.512 CHRONIC LEFT SHOULDER PAIN: Primary | ICD-10-CM

## 2022-12-14 DIAGNOSIS — G89.29 CHRONIC LEFT SHOULDER PAIN: Primary | ICD-10-CM

## 2022-12-14 PROCEDURE — 97140 MANUAL THERAPY 1/> REGIONS: CPT | Performed by: PHYSICAL THERAPIST

## 2022-12-14 PROCEDURE — 97110 THERAPEUTIC EXERCISES: CPT | Performed by: PHYSICAL THERAPIST

## 2022-12-14 NOTE — PROGRESS NOTES
Physical Therapy Daily Progress Note    Subjective   Lorenzo Cortes reports: underneath his collar bone feels painful and tight. Overall, he feels like the exercises are helping.    Objective   See Exercise, Manual, and Modality Logs for complete treatment.       Assessment/Plan   Pt tolerated treatment well. He had mod fatigue with standing resisted T's and Y's. STM to pec minor decreased pain level. Pt would benefit from continued skilled PT.     Progress per Plan of Care           Manual Therapy:    16     mins  49159;  Therapeutic Exercise:    10     mins  50801;     Neuromuscular Anders:        mins  08332;    Therapeutic Activity:          mins  94890;     Gait Training:          mins  54106;     Ultrasound:          mins  15128;    Electrical Stimulation:         mins  51041 ( );  E-Stim Attended:         mins  05134  Iontophoresis          mins 93817   Traction          mins  96010  Fluidotherapy          mins  98731  Dry Needling         mins self-pay - No Charge  Paraffin          mins  89180    Timed Treatment:   26   mins   Total Treatment:     48   mins    Haley Bolanos, PT, DPT  Physical Therapist

## 2022-12-21 ENCOUNTER — TREATMENT (OUTPATIENT)
Dept: PHYSICAL THERAPY | Facility: CLINIC | Age: 32
End: 2022-12-21

## 2022-12-21 DIAGNOSIS — G89.29 CHRONIC LEFT SHOULDER PAIN: Primary | ICD-10-CM

## 2022-12-21 DIAGNOSIS — M25.512 CHRONIC LEFT SHOULDER PAIN: Primary | ICD-10-CM

## 2022-12-21 PROCEDURE — 97112 NEUROMUSCULAR REEDUCATION: CPT | Performed by: PHYSICAL THERAPIST

## 2022-12-21 PROCEDURE — 97140 MANUAL THERAPY 1/> REGIONS: CPT | Performed by: PHYSICAL THERAPIST

## 2022-12-21 NOTE — PROGRESS NOTES
Physical Therapy Daily Progress Note    Subjective   Lorenzo Cortes reports: he is feeling better overall. He had some tightness in the front of his shoulder yesterday but was able to work it out with self massage.      Objective   See Exercise, Manual, and Modality Logs for complete treatment.       Assessment/Plan   Pt tolerated treatment well. He required tactile cues to squeeze his shoulder down and back with scapular circles. Pt would benefit from continued skilled PT.     Progress per Plan of Care           Manual Therapy:    10     mins  74658;  Therapeutic Exercise:         mins  39394;     Neuromuscular Anders:  14      mins  05759;    Therapeutic Activity:          mins  84808;     Gait Training:           mins  56076;     Ultrasound:          mins  52244;    Electrical Stimulation:         mins  37878 ( );  E-Stim Attended:         mins  33138  Iontophoresis          mins 19996   Traction          mins  68986  Fluidotherapy          mins  08667  Dry Needling          mins self-pay - No Charge  Paraffin          mins  50835    Timed Treatment:   24   mins   Total Treatment:     42   mins    Haley Bolanos, PT, DPT  Physical Therapist

## 2023-09-05 ENCOUNTER — OFFICE VISIT (OUTPATIENT)
Dept: INTERNAL MEDICINE | Facility: CLINIC | Age: 33
End: 2023-09-05
Payer: COMMERCIAL

## 2023-09-05 ENCOUNTER — HOSPITAL ENCOUNTER (OUTPATIENT)
Dept: GENERAL RADIOLOGY | Facility: HOSPITAL | Age: 33
Discharge: HOME | End: 2023-09-05
Admitting: STUDENT IN AN ORGANIZED HEALTH CARE EDUCATION/TRAINING PROGRAM
Payer: COMMERCIAL

## 2023-09-05 VITALS
RESPIRATION RATE: 20 BRPM | DIASTOLIC BLOOD PRESSURE: 82 MMHG | BODY MASS INDEX: 33.94 KG/M2 | HEART RATE: 88 BPM | SYSTOLIC BLOOD PRESSURE: 140 MMHG | TEMPERATURE: 98.4 F | WEIGHT: 243.38 LBS

## 2023-09-05 DIAGNOSIS — S83.412A SPRAIN OF MEDIAL COLLATERAL LIGAMENT OF LEFT KNEE, INITIAL ENCOUNTER: ICD-10-CM

## 2023-09-05 DIAGNOSIS — M25.562 ACUTE PAIN OF LEFT KNEE: Primary | ICD-10-CM

## 2023-09-05 DIAGNOSIS — M25.562 ACUTE PAIN OF LEFT KNEE: ICD-10-CM

## 2023-09-05 PROCEDURE — 73562 X-RAY EXAM OF KNEE 3: CPT

## 2023-09-05 NOTE — PROGRESS NOTES
"    Follow Up Office Visit      Date: 2023   Patient Name: Lorenzo Cortes  : 1990   MRN: [id number]     Chief Complaint:    Chief Complaint   Patient presents with    Knee Pain       History of Present Illness: Lorenzo Cortes is a 33 y.o. male who is here today for left knee pain.    HPI    Patient states 7 or 8 weeks ago he was playing volleyball, went up for a block and felt a pop in his left knee when he landed. He notes he did not play for about 2 weeks after he felt the pop in his knee. He played pickleball once and his knee felt fine. The next week he tried to play volleyball again and after about 1 hour, his knee started to \"give\". He notes a sharp pain when he would move to the side or twist his knee. Last Monday, 2023, he and his son were jumping on the trampoline for about an hour and he could tell his knee was starting to feel weak, so he stopped jumping. He states the next day he tried some stretches to help with mobility and as he went to do one of the stretches he states if felt as though someone ran a \"hot alberto\" down the front center of his left knee. He notes sitting with bilateral legs extended out in front of him, the left knee seemed swollen and he could \"feel\" the fluid in his knee. He denies his knee completely giving out from walking but if he applies too much pressure on the left knee it will give out. He has been treating the pain with ibuprofen.   Subjective      Review of Systems:   Review of Systems   Musculoskeletal:         Left knee pain and tenderness     I have reviewed the patients family history, social history, past medical history, past surgical history and have updated it as appropriate.     Medications:     Current Outpatient Medications:     azelastine (ASTELIN) 0.1 % nasal spray, Every 12 (Twelve) Hours., Disp: , Rfl:     buPROPion XL (Wellbutrin XL) 150 MG 24 hr tablet, Take 1 tablet by mouth Daily. Take one tablet once daily for three days, then increase to " one tablet twice daily. Start 7 days prior to quit date., Disp: 60 tablet, Rfl: 0    fexofenadine-pseudoephedrine (ALLEGRA-D 24) 180-240 MG per 24 hr tablet, Take 1 tablet by mouth Daily As Needed for Allergies or Rhinitis., Disp: 20 tablet, Rfl: 0    fluticasone (FLONASE) 50 MCG/ACT nasal spray, fluticasone propionate 50 mcg/actuation nasal spray,suspension  Spray 1 spray every day by intranasal route., Disp: , Rfl:     sildenafil (VIAGRA) 50 MG tablet, sildenafil 50 mg tablet  Take 1 tablet every day by oral route as needed., Disp: , Rfl:     Allergies:   Allergies   Allergen Reactions    Amoxicillin Hives    Azithromycin Hives    Cefaclor Hives       Objective     Physical Exam: Please see above  Vital Signs:   Vitals:    09/05/23 0823   BP: 140/82   BP Location: Left arm   Patient Position: Sitting   Cuff Size: Adult   Pulse: 88   Resp: 20   Temp: 98.4 °F (36.9 °C)   TempSrc: Temporal   Weight: 110 kg (243 lb 6 oz)   PainSc:   4   PainLoc: Knee     Body mass index is 33.94 kg/m².    Physical Exam  Vitals reviewed.   Constitutional:       General: He is not in acute distress.     Appearance: Normal appearance. He is obese. He is not ill-appearing or toxic-appearing.   HENT:      Head: Normocephalic and atraumatic.      Right Ear: External ear normal.      Left Ear: External ear normal.      Nose: Nose normal. No congestion.      Mouth/Throat:      Mouth: Mucous membranes are moist.   Eyes:      General: No scleral icterus.        Right eye: No discharge.         Left eye: No discharge.      Extraocular Movements: Extraocular movements intact.      Pupils: Pupils are equal, round, and reactive to light.   Cardiovascular:      Rate and Rhythm: Normal rate and regular rhythm.      Pulses: Normal pulses.      Heart sounds: Normal heart sounds. No murmur heard.    No friction rub. No gallop.   Pulmonary:      Effort: Pulmonary effort is normal. No respiratory distress.      Breath sounds: Normal breath sounds. No  wheezing, rhonchi or rales.   Abdominal:      General: Abdomen is flat. There is no distension.   Musculoskeletal:         General: Tenderness (along medial left knee joint line) present. No swelling or deformity. Normal range of motion.      Cervical back: Normal range of motion. No rigidity.      Right knee: No swelling, deformity, effusion, erythema, ecchymosis or crepitus. Normal range of motion. No LCL laxity, MCL laxity, ACL laxity or PCL laxity. Normal alignment and normal meniscus. Normal pulse.      Instability Tests: Anterior drawer test negative. Posterior drawer test negative. Medial Celestine test negative and lateral Celestine test negative.      Left knee: No swelling, deformity, effusion, erythema, ecchymosis or crepitus. Normal range of motion. MCL laxity present. No LCL laxity, ACL laxity or PCL laxity.Normal alignment and normal meniscus. Normal pulse.      Instability Tests: Anterior drawer test negative. Posterior drawer test negative. Medial Celestine test negative and lateral Celestine test negative.      Comments: Pain with compression of left patella.    Skin:     General: Skin is warm and dry.      Capillary Refill: Capillary refill takes less than 2 seconds.      Coloration: Skin is not jaundiced or pale.   Neurological:      General: No focal deficit present.      Mental Status: He is alert and oriented to person, place, and time. Mental status is at baseline.   Psychiatric:         Mood and Affect: Mood normal.         Behavior: Behavior normal.         Judgment: Judgment normal.       Procedures    Results:   Labs:   No results found for: HGBA1C, CMP, CBCDIFFPANEL, CREAT, TSH     Imaging:   No valid procedures specified.     Assessment / Plan      Assessment/Plan:   Problem List Items Addressed This Visit          Musculoskeletal and Injuries    Acute pain of left knee - Primary    Relevant Orders    Ambulatory Referral to Orthopedic Surgery    XR Knee 3 View Left    Sprain of medial  collateral ligament of left knee    Relevant Orders    Ambulatory Referral to Physical Therapy Evaluate and treat    XR Knee 3 View Left       Advised patient to continue 600 mg to 800 mg ibuprofen as needed for pain.  - mild medial joint laxity of left knee. Symptoms most consistent with knee sprain. Likely a degree of patellofemoral syndrome as well  - will obtain Xray of the left knee  - referred to PT  - referred to ortho per patient preference.    Follow Up:   Return if symptoms worsen or fail to improve.        Chava Ramos MD  [facility] Aleksandr Nicholas  Transcribed from ambient dictation for Chava Ramos MD by Charla Thacker.  09/05/23   09:58 EDT    Patient or patient representative verbalized consent to the visit recording.  I have personally performed the services described in this document as transcribed by the above individual, and it is both accurate and complete.

## 2023-09-06 ENCOUNTER — OFFICE VISIT (OUTPATIENT)
Dept: ORTHOPEDIC SURGERY | Facility: CLINIC | Age: 33
End: 2023-09-06
Payer: COMMERCIAL

## 2023-09-06 VITALS
BODY MASS INDEX: 33.95 KG/M2 | DIASTOLIC BLOOD PRESSURE: 88 MMHG | WEIGHT: 242.51 LBS | HEIGHT: 71 IN | SYSTOLIC BLOOD PRESSURE: 126 MMHG

## 2023-09-06 DIAGNOSIS — S89.92XA INJURY OF LEFT KNEE, INITIAL ENCOUNTER: Primary | ICD-10-CM

## 2023-09-06 DIAGNOSIS — M23.92 INTERNAL DERANGEMENT OF LEFT KNEE: ICD-10-CM
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
## 2023-09-29 ENCOUNTER — TELEPHONE (OUTPATIENT)
Dept: ORTHOPEDIC SURGERY | Facility: CLINIC | Age: 33
End: 2023-09-29

## 2023-09-29 ENCOUNTER — TREATMENT (OUTPATIENT)
Dept: PHYSICAL THERAPY | Facility: CLINIC | Age: 33
End: 2023-09-29
Payer: COMMERCIAL

## 2023-09-29 DIAGNOSIS — M25.562 CHRONIC PAIN OF LEFT KNEE: Primary | ICD-10-CM

## 2023-09-29 DIAGNOSIS — G89.29 CHRONIC PAIN OF LEFT KNEE: Primary | ICD-10-CM

## 2023-09-29 PROCEDURE — 97161 PT EVAL LOW COMPLEX 20 MIN: CPT | Performed by: PHYSICAL THERAPIST

## 2023-09-29 PROCEDURE — 97110 THERAPEUTIC EXERCISES: CPT | Performed by: PHYSICAL THERAPIST

## 2023-09-29 NOTE — TELEPHONE ENCOUNTER
Caller: Lorenzo Cortes    Relationship: Self    Best call back number:     What form or medical record are you requesting: ORDER FOR MRI    Who is requesting this form or medical record from you: [facility] [phone]    How would you like to receive the form or medical records (pick-up, mail, fax): FAX  If fax, what is the fax number: PATIENT DIDN'T HAVE FAX #    Timeframe paperwork needed: ASAP

## 2023-09-29 NOTE — PROGRESS NOTES
Physical Therapy Initial Evaluation and Plan of Care    [facility]    [address], [address] [place] [address]    Patient: Lorenzo Cortes   : 1990  Diagnosis/ICD-10 Code:  Chronic pain of left knee [M25.562, G89.29]  Referring practitioner: Chava Ramos MD  Date of Initial Visit: 2023  Today's Date: 2023  Patient seen for 1 session         Visit Diagnoses:    ICD-10-CM ICD-9-CM   1. Chronic pain of left knee  M25.562 719.46    G89.29 338.29         Subjective Evaluation    History of Present Illness  Mechanism of injury: Pt states that he was playing volleyball in early July and he jumped and when he landed he felt a pop in the left knee. He stopped playing for the day and rested for a few weeks and had pain when he returned to play. He rested again for a few weeks but then had pain when jumping on a trampoline and when working out. He eventually got in to see Dr. Muñoz and he suspected a possible meniscus issue. In the past 3 weeks most of his discomfort has been on the medial aspect of the left knee. He feels like there is some swelling in the left knee and he has a lot of difficulty with squatting and jaron with trying to rise from a squatting position. He gets swelling after he has been walking for a while and he feels some instability at times in the knee when walking.       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    Treatments  No previous or current treatments  Patient Goals  Patient goals for therapy: decreased pain, decreased edema,  increased motion, increased strength and return to sport/leisure activities           Objective          Palpation     Additional Palpation Details  TTP noted at the medial joint line of the left knee. Moderate TTP noted at the left pes anserine as well.     Active Range of Motion   Left Knee   Flexion: 132 degrees   Extension: 0 degrees     Strength/Myotome Testing     Left Hip   Planes of Motion   Flexion: 5  Extension: 4-  Abduction: 4-    Right Hip   Planes of Motion   Flexion: 5  Extension: 4-  Abduction: 4-    Left Knee   Flexion: 4+  Extension: 5  Quadriceps contraction: fair    Right Knee   Flexion: 5  Extension: 5  Quadriceps contraction: good    Tests     Left Knee   Positive Apley's compression and medial Celestine.   Negative anterior Lachman and posterior Lachman.     Ambulation     Ambulation: Level Surfaces     Additional Level Surfaces Ambulation Details  Mild gait deviation with a decreased weight shift to the left and decreased weight acceptance on the LLE.         Assessment & Plan       Assessment  Impairments: abnormal gait, abnormal muscle tone, abnormal or restricted ROM, activity intolerance, impaired balance, impaired physical strength, lacks appropriate home exercise program, pain with function and weight-bearing intolerance   Functional limitations: carrying objects, lifting, walking, uncomfortable because of pain, sitting and standing   Assessment details: Patient is a 33 year old male who comes to physical therapy with c/o pain in the left knee. Signs and symptoms are consistent with left knee medial meniscus pathology resulting in pain, decreased ROM, decreased strength, and inability to perform all essential functional activities. Pt will benefit from skilled PT services to address the above issues.     Prognosis details:   SHORT TERM GOALS:  2 weeks       1. Pt independent with HEP  2. Pt to demonstrate mar hip strength 4/5 or greater to improve stability with ambulation  3. Pt to  report being able to walk for 10 minutes without increasing pain in the left knee    LONG TERM GOALS:   8 weeks  1. Pt to demonstrate ability to perform full functional squat with good form and control of the knees and without increasing pain  2. Pt to demonstrate mar hip strength to 4+/5 or greater to improve safety with ambulation on uneven surfaces  3. Pt to return to work full duty without increased pain in the left knee   4. Pt to demonstrate ability to perform step up/down 8 inch step x10 safely and without pain in the left knee       Plan  Therapy options: will be seen for skilled therapy services  Planned modality interventions: cryotherapy, electrical stimulation/Russian stimulation, high voltage pulsed current (pain management), iontophoresis, microcurrent electrical stimulation, TENS, thermotherapy (hydrocollator packs) and ultrasound  Planned therapy interventions: abdominal trunk stabilization, ADL retraining, balance/weight-bearing training, body mechanics training, flexibility, functional ROM exercises, gait training, home exercise program, IADL retraining, joint mobilization, manual therapy, motor coordination training, neuromuscular re-education, soft tissue mobilization, strengthening, stretching and therapeutic activities  Frequency: 2x week  Duration in weeks: 8  Treatment plan discussed with: patient        Timed:         Manual Therapy:         mins  17979;     Therapeutic Exercise:    23     mins  32393;     Neuromuscular Anders:        mins  24369;    Therapeutic Activity:          mins  60285;     Gait Training:           mins  16838;     Ultrasound:          mins  44240;    Ionto                                   mins   62672  Self Care                            mins   44155  Canalith Repos         mins 92344      Un-Timed:  Electrical Stimulation:         mins  63448 ( );  Dry Needling          mins self-pay  Traction          mins 21537  Low Eval     30     Mins  30407  Mod Eval           Mins  94478  High Eval                            Mins  78948        Timed Treatment:   23   mins   Total Treatment:     53   mins          PT: Edin Watson PT, DPT, OCS, Cert. DN   License Number: [id number]  Electronically signed by Edin Watson PT, 09/29/23, 8:43 AM EDT    Certification Period: 9/29/2023 thru 12/27/2023  I certify that the therapy services are furnished while this patient is under my care.  The services outlined above are required by this patient, and will be reviewed every 90 days.         Physician Signature:__________________________________________________    PHYSICIAN: Chava Ramos MD  NPI: [id number]                                      DATE:      Please sign and return via fax to [email or web address] . Thank you, [facility].

## 2023-10-04 ENCOUNTER — TREATMENT (OUTPATIENT)
Dept: PHYSICAL THERAPY | Facility: CLINIC | Age: 33
End: 2023-10-04
Payer: COMMERCIAL

## 2023-10-04 DIAGNOSIS — G89.29 CHRONIC PAIN OF LEFT KNEE: Primary | ICD-10-CM

## 2023-10-04 DIAGNOSIS — M25.562 CHRONIC PAIN OF LEFT KNEE: Primary | ICD-10-CM

## 2023-10-04 PROCEDURE — 97140 MANUAL THERAPY 1/> REGIONS: CPT | Performed by: PHYSICAL THERAPIST

## 2023-10-04 PROCEDURE — 97014 ELECTRIC STIMULATION THERAPY: CPT | Performed by: PHYSICAL THERAPIST

## 2023-10-04 PROCEDURE — 97110 THERAPEUTIC EXERCISES: CPT | Performed by: PHYSICAL THERAPIST

## 2023-10-04 NOTE — PROGRESS NOTES
Physical Therapy Daily Treatment Note    [facility]   [address], [address] [place] [address]      Patient: Lorenzo Cortes   : 1990  Referring practitioner: Chava Ramos MD  Date of Initial Visit: Type: THERAPY  Noted: 2023  Today's Date: 10/4/2023  Patient seen for 2 sessions    Certification Period 10/4/2023 thru 2024       Visit Diagnoses:    ICD-10-CM ICD-9-CM   1. Chronic pain of left knee  M25.562 719.46    G89.29 338.29       Subjective     Pt states that he is feeling about the same. He has been working on his exercises at home and feels some soreness after the exercise, but no specific increase in pain in the knee joint.     Objective   See Exercise, Manual, and Modality Logs for complete treatment.       Assessment/Plan     Pt had some discomfort with starting on the bike today, but he was able to perform most exercise without any significant increase in pain. Will cont to work on improving strength as tolerated.       Timed:         Manual Therapy:    12     mins  90480;     Therapeutic Exercise:    30     mins  75697;     Neuromuscular Anders:        mins  49986;    Therapeutic Activity:          mins  16366;     Gait Training:           mins  34411;     Ultrasound:          mins  93732;    Ionto                                   mins   41704  Self Care                            mins   75505  Canalith Repos         mins 05097  Electrical Stimulation:         mins  98216    Un-Timed:  Electrical Stimulation:    20     mins  85633 ( );  Dry Needling          mins self-pay  Traction          mins 29985      Timed Treatment:   42   mins   Total Treatment:     62   mins    Edin Watson, PT, DPT, OCS, Cert. DN  [place] License: [id number]

## 2023-10-10 ENCOUNTER — TELEPHONE (OUTPATIENT)
Dept: PHYSICAL THERAPY | Facility: CLINIC | Age: 33
End: 2023-10-10

## 2023-10-10 NOTE — TELEPHONE ENCOUNTER
Caller: Lorenzo Cortes    Relationship: Self       What was the call regarding: HAS STOMACH BUG

## 2023-10-16 ENCOUNTER — TREATMENT (OUTPATIENT)
Dept: PHYSICAL THERAPY | Facility: CLINIC | Age: 33
End: 2023-10-16
Payer: COMMERCIAL

## 2023-10-16 DIAGNOSIS — S89.92XA INJURY OF LEFT KNEE, INITIAL ENCOUNTER: ICD-10-CM

## 2023-10-16 DIAGNOSIS — G89.29 CHRONIC PAIN OF LEFT KNEE: Primary | ICD-10-CM

## 2023-10-16 DIAGNOSIS — M25.562 CHRONIC PAIN OF LEFT KNEE: Primary | ICD-10-CM

## 2023-10-16 DIAGNOSIS — M23.92 INTERNAL DERANGEMENT OF LEFT KNEE: ICD-10-CM

## 2023-10-16 PROCEDURE — 97110 THERAPEUTIC EXERCISES: CPT | Performed by: PHYSICAL THERAPIST

## 2023-10-16 PROCEDURE — 97140 MANUAL THERAPY 1/> REGIONS: CPT | Performed by: PHYSICAL THERAPIST

## 2023-10-16 NOTE — PROGRESS NOTES
Physical Therapy Daily Treatment Note    [facility]   [address], [address] [place] [address]      Patient: Lorenzo Cortes   : 1990  Referring practitioner: Chava Ramos MD  Date of Initial Visit: Type: THERAPY  Noted: 2023  Today's Date: 10/16/2023  Patient seen for 3 sessions    Certification Period 10/16/2023 thru 2024       Visit Diagnoses:    ICD-10-CM ICD-9-CM   1. Chronic pain of left knee  M25.562 719.46    G89.29 338.29       Subjective     Pt states that after his last session his knee was sore for a couple of days. He feels some soreness even when he decreases the intensity of his exercise at home, but he doesn't really feel pain in the moment.     Objective   See Exercise, Manual, and Modality Logs for complete treatment.       Assessment/Plan     Decreased intensity of exercise in the clinic today due to pt's continued soreness in the knee. Pt did not have any c/o pain with exercise and he had some decrease in intensity with manual intervention. At this time we will wait for his follow-up with Dr. Muñoz before making any decisions about how to progress with therapy.       Timed:         Manual Therapy:    14     mins  25333;     Therapeutic Exercise:    28     mins  93285;     Neuromuscular Anders:        mins  47008;    Therapeutic Activity:          mins  99439;     Gait Training:           mins  88402;     Ultrasound:          mins  26591;    Ionto                                   mins   85948  Self Care                            mins   63482  Canalith Repos         mins 22329  Electrical Stimulation:         mins  72034    Un-Timed:  Electrical Stimulation:         mins  71857 ( );  Dry Needling          mins self-pay  Traction          mins 37386      Timed Treatment:   42   mins   Total Treatment:     42   mins    Edin Watson, PT, DPT, OCS, Cert. DN  [place] License: [id number]

## 2023-10-18 ENCOUNTER — OFFICE VISIT (OUTPATIENT)
Dept: ORTHOPEDIC SURGERY | Facility: CLINIC | Age: 33
End: 2023-10-18
Payer: COMMERCIAL

## 2023-10-18 VITALS
DIASTOLIC BLOOD PRESSURE: 84 MMHG | SYSTOLIC BLOOD PRESSURE: 132 MMHG | HEIGHT: 71 IN | BODY MASS INDEX: 34.33 KG/M2 | WEIGHT: 245.2 LBS

## 2023-10-18 DIAGNOSIS — S83.242D ACUTE MEDIAL MENISCUS TEAR OF LEFT KNEE, SUBSEQUENT ENCOUNTER: Primary | ICD-10-CM
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
## 2023-10-19 ENCOUNTER — TREATMENT (OUTPATIENT)
Dept: PHYSICAL THERAPY | Facility: CLINIC | Age: 33
End: 2023-10-19
Payer: COMMERCIAL

## 2023-10-19 DIAGNOSIS — M25.562 CHRONIC PAIN OF LEFT KNEE: Primary | ICD-10-CM

## 2023-10-19 DIAGNOSIS — G89.29 CHRONIC PAIN OF LEFT KNEE: Primary | ICD-10-CM

## 2023-10-19 PROCEDURE — 97140 MANUAL THERAPY 1/> REGIONS: CPT | Performed by: PHYSICAL THERAPIST

## 2023-10-19 PROCEDURE — 97110 THERAPEUTIC EXERCISES: CPT | Performed by: PHYSICAL THERAPIST

## 2023-10-20 NOTE — PROGRESS NOTES
Physical Therapy Daily Treatment Note    [facility]   [address], [address] [place] [address]      Patient: Lorenzo Cortes   : 1990  Referring practitioner: Chava Ramos MD  Date of Initial Visit: Type: THERAPY  Noted: 2023  Today's Date: 10/20/2023  Patient seen for 4 sessions    Certification Period 10/20/2023 thru 2024       Visit Diagnoses:    ICD-10-CM ICD-9-CM   1. Chronic pain of left knee  M25.562 719.46    G89.29 338.29       Subjective     Patient states that his knee is feeling about the same.  He recently saw Dr. Muñoz who reviewed his MRIs and determined that surgery was the best course of treatment.  Patient is scheduled to have surgery on the left knee for a partial medial meniscectomy on .  At this time he would like to continue with some his physical therapy treatments to help maintain his strength and range of motion of the knee before surgery.    Objective   See Exercise, Manual, and Modality Logs for complete treatment.       Assessment/Plan     Patient continues to be limited with activity due to pain in the left knee and he demonstrates a decrease in pain-free range of motion from norm.  At this time we will continue with physical therapy treatments as a prep for surgery to improve quad strength and help try to maintain left knee active range of motion.      Timed:         Manual Therapy:    14     mins  95816;     Therapeutic Exercise:    40     mins  40634;     Neuromuscular Anders:        mins  80225;    Therapeutic Activity:          mins  19158;     Gait Training:           mins  45073;     Ultrasound:          mins  07339;    Ionto                                   mins   07775  Self Care                            mins   12669  Canalith Repos         mins 61771  Electrical Stimulation:         mins  71597    Un-Timed:  Electrical Stimulation:         mins  68090 ( );  Dry Needling          mins self-pay  Traction          mins 85335      Timed  Treatment:   54   mins   Total Treatment:     54   mins    Edin Watson PT, DEBBIT, OCS, Cert. DN  [place] License: [id number]

## 2023-10-23 ENCOUNTER — TREATMENT (OUTPATIENT)
Dept: PHYSICAL THERAPY | Facility: CLINIC | Age: 33
End: 2023-10-23
Payer: COMMERCIAL

## 2023-10-23 DIAGNOSIS — M25.562 CHRONIC PAIN OF LEFT KNEE: Primary | ICD-10-CM

## 2023-10-23 DIAGNOSIS — G89.29 CHRONIC PAIN OF LEFT KNEE: Primary | ICD-10-CM

## 2023-10-23 PROCEDURE — 97140 MANUAL THERAPY 1/> REGIONS: CPT | Performed by: PHYSICAL THERAPIST

## 2023-10-23 PROCEDURE — 97110 THERAPEUTIC EXERCISES: CPT | Performed by: PHYSICAL THERAPIST

## 2023-10-23 PROCEDURE — 97112 NEUROMUSCULAR REEDUCATION: CPT | Performed by: PHYSICAL THERAPIST

## 2023-10-23 NOTE — PROGRESS NOTES
Physical Therapy Daily Treatment Note    [facility]   [address], [address] [place] [address]      Patient: Lorenzo Cortes   : 1990  Referring practitioner: Chava Ramos MD  Date of Initial Visit: Type: THERAPY  Noted: 2023  Today's Date: 10/23/2023  Patient seen for 5 sessions    Certification Period 10/23/2023 thru 2024       Visit Diagnoses:    ICD-10-CM ICD-9-CM   1. Chronic pain of left knee  M25.562 719.46    G89.29 338.29       Subjective     Pt states that he felt good after the previous session and she was able to do some exercise at home without increasing pain in the knee. For the past couple of days he has had some giving out of the left knee several times. He has an increase in pain when he knee gives out and then he has some soreness for a while after it gives out.     Objective   See Exercise, Manual, and Modality Logs for complete treatment.       Assessment/Plan     Pt is making good progress in regards to improving his strength and proprioception of the left knee. We are limited with exercise due to pain in the knee, but still able to make progress with strength and improved ability to perform functional activity.       Timed:         Manual Therapy:    13     mins  20550;     Therapeutic Exercise:    30     mins  08559;     Neuromuscular Anders:    10    mins  91412;    Therapeutic Activity:          mins  22853;     Gait Training:           mins  64549;     Ultrasound:          mins  94605;    Ionto                                   mins   51963  Self Care                            mins   70092  Canalith Repos         mins 14261  Electrical Stimulation:         mins  30110    Un-Timed:  Electrical Stimulation:         mins  69284 ( );  Dry Needling          mins self-pay  Traction          mins 07255      Timed Treatment:   53   mins   Total Treatment:     53   mins    Edin Watson, PT, DPT, OCS, Cert. DN  [place] License: [id number]

## 2023-10-26 ENCOUNTER — TREATMENT (OUTPATIENT)
Dept: PHYSICAL THERAPY | Facility: CLINIC | Age: 33
End: 2023-10-26
Payer: COMMERCIAL

## 2023-10-26 DIAGNOSIS — G89.29 CHRONIC PAIN OF LEFT KNEE: Primary | ICD-10-CM

## 2023-10-26 DIAGNOSIS — M25.562 CHRONIC PAIN OF LEFT KNEE: Primary | ICD-10-CM

## 2023-10-26 PROCEDURE — 97112 NEUROMUSCULAR REEDUCATION: CPT | Performed by: PHYSICAL THERAPIST

## 2023-10-26 PROCEDURE — 97140 MANUAL THERAPY 1/> REGIONS: CPT | Performed by: PHYSICAL THERAPIST

## 2023-10-26 PROCEDURE — 97110 THERAPEUTIC EXERCISES: CPT | Performed by: PHYSICAL THERAPIST

## 2023-10-26 NOTE — PROGRESS NOTES
Physical Therapy Daily Treatment Note    [facility]   [address], [address] [place] [address]      Patient: Lorenzo Cortes   : 1990  Referring practitioner: Chava Ramos MD  Date of Initial Visit: Type: THERAPY  Noted: 2023  Today's Date: 10/26/2023  Patient seen for 6 sessions    Certification Period 10/26/2023 thru 2024       Visit Diagnoses:    ICD-10-CM ICD-9-CM   1. Chronic pain of left knee  M25.562 719.46    G89.29 338.29       Subjective     Pt states that he is feeling some improvement and he has had less soreness/pain as compared to previous visits. He feels some soreness around the knee but no sharp pain at this time.     Objective   See Exercise, Manual, and Modality Logs for complete treatment.       Assessment/Plan     Pt is making steady progress with therapy and he demonstrates an improvement in his tolerance to activity and LE strength. Will cont to progress as tolerated up until his surgery date.       Timed:         Manual Therapy:    12     mins  00097;     Therapeutic Exercise:    15     mins  88322;     Neuromuscular Anders:    30    mins  49386;    Therapeutic Activity:          mins  36739;     Gait Training:           mins  48668;     Ultrasound:          mins  98878;    Ionto                                   mins   65728  Self Care                            mins   71029  Canalith Repos         mins 07147  Electrical Stimulation:         mins  21710    Un-Timed:  Electrical Stimulation:         mins  92412 ( );  Dry Needling          mins self-pay  Traction          mins 29711      Timed Treatment:   57   mins   Total Treatment:     57   mins    Edin Watson, PT, DPT, OCS, Cert. DN  [place] License: [id number]

## 2023-11-07 ENCOUNTER — OUTSIDE FACILITY SERVICE (OUTPATIENT)
Dept: ORTHOPEDIC SURGERY | Facility: CLINIC | Age: 33
End: 2023-11-07
Payer: COMMERCIAL

## 2023-11-07 DIAGNOSIS — Z98.890 S/P ARTHROSCOPIC PARTIAL MEDIAL MENISCECTOMY OF LEFT KNEE: Primary | ICD-10-CM

## 2023-11-07 DIAGNOSIS — Z87.828 S/P ARTHROSCOPIC PARTIAL MEDIAL MENISCECTOMY OF LEFT KNEE: Primary | ICD-10-CM

## 2023-11-07 RX ORDER — ONDANSETRON 4 MG/1
4 TABLET, FILM COATED ORAL EVERY 8 HOURS PRN
Qty: 10 TABLET | Refills: 1 | Status: SHIPPED | OUTPATIENT
Start: 2023-11-07

## 2023-11-07 RX ORDER — IBUPROFEN 800 MG/1
800 TABLET ORAL 3 TIMES DAILY
Qty: 90 TABLET | Refills: 0 | Status: SHIPPED | OUTPATIENT
Start: 2023-11-07

## 2023-11-07 RX ORDER — HYDROCODONE BITARTRATE AND ACETAMINOPHEN 5; 325 MG/1; MG/1
1 TABLET ORAL EVERY 6 HOURS PRN
Qty: 12 TABLET | Refills: 0 | Status: SHIPPED | OUTPATIENT
Start: 2023-11-07

## 2023-11-15 ENCOUNTER — OFFICE VISIT (OUTPATIENT)
Dept: ORTHOPEDIC SURGERY | Facility: CLINIC | Age: 33
End: 2023-11-15
Payer: COMMERCIAL

## 2023-11-15 ENCOUNTER — TELEPHONE (OUTPATIENT)
Dept: ORTHOPEDIC SURGERY | Facility: CLINIC | Age: 33
End: 2023-11-15

## 2023-11-15 VITALS — TEMPERATURE: 97.1 F

## 2023-11-15 DIAGNOSIS — Z87.828 S/P ARTHROSCOPIC PARTIAL MEDIAL MENISCECTOMY OF LEFT KNEE: Primary | ICD-10-CM

## 2023-11-15 DIAGNOSIS — Z98.890 S/P ARTHROSCOPIC PARTIAL MEDIAL MENISCECTOMY OF LEFT KNEE: Primary | ICD-10-CM

## 2023-11-15 NOTE — TELEPHONE ENCOUNTER
Caller: IVON   Relationship to Patient: SELF  Phone Number: [phone]  Reason for Call: PATIENT CALLED STATING HE FORGOT TO ASK IF HE IS SUPPOSED TO TAKE OFF THE STERI STRIPS OR IF THEY WILL FALL OFF

## 2023-11-15 NOTE — PROGRESS NOTES
Chief complaint follow-up left knee arthroscopy partial medial meniscectomy November 7          HPI    He is doing well without new complaint    Vitals:    11/15/23 0901   Temp: 97.1 °F (36.2 °C)         Physical Exam:    Left knee looks excellent.  No swelling no effusion.  Negative Homans' sign.  Portals look good    X-RAY REPORT:  Imaging Results (Last 7 Days)       ** No results found for the last 168 hours. **                  ICD-10-CM ICD-9-CM   1. S/P arthroscopic partial medial meniscectomy of left knee  Z98.890 V45.89    Z87.828 V15.59       Orders Placed This Encounter   Procedures    Ambulatory Referral to Physical Therapy Evaluate and treat     He will do physical therapy at [facility].  He has a seated job already.  He will follow-up in 3 weeks        Deven Muñoz M.D., [facility]  Orthopedic Surgeon  Fellowship Trained Sports Medicine  [facility]  Orthopedics and Sports Medicine  [address] [place] [address], [address]  [place] [address]      EMR Dragon/Transcription disclaimer:  Much of this encounter note is an electronic transcription of spoken language to printed text. Electronic transcription of spoken language may permit erroneous, or at times, nonsensical words or phrases to be inadvertently transcribed. Although I have reviewed the note for such errors, some may still exist.  Answers submitted by the patient for this visit:  Primary Reason for Visit (Submitted on 11/8/2023)  What is the primary reason for your visit?: Other  Other (Submitted on 11/8/2023)  Please describe your symptoms.: Surgical follow up  Have you had these symptoms before?: No  How long have you been having these symptoms?: 1-4 days  Please list any medications you are currently taking for this condition.: Ibuprofen, hydrocodone, fennigrin  Please describe any probable cause for these symptoms. : Surgery on 11/7

## 2023-11-15 NOTE — TELEPHONE ENCOUNTER
HUB CAN RELAY.     L/M letting pt know that the steri-strips will fall off on their own, if he has any questions to call the office back.

## 2023-11-20 ENCOUNTER — TREATMENT (OUTPATIENT)
Dept: PHYSICAL THERAPY | Facility: CLINIC | Age: 33
End: 2023-11-20
Payer: COMMERCIAL

## 2023-11-20 DIAGNOSIS — M25.562 CHRONIC PAIN OF LEFT KNEE: Primary | ICD-10-CM

## 2023-11-20 DIAGNOSIS — G89.29 CHRONIC PAIN OF LEFT KNEE: Primary | ICD-10-CM

## 2023-11-20 PROCEDURE — 97110 THERAPEUTIC EXERCISES: CPT | Performed by: PHYSICAL THERAPIST

## 2023-11-20 PROCEDURE — 97161 PT EVAL LOW COMPLEX 20 MIN: CPT | Performed by: PHYSICAL THERAPIST

## 2023-11-20 NOTE — PROGRESS NOTES
Physical Therapy Initial Evaluation and Plan of Care    [facility]    [address], [address] [place] [address]    Patient: Lorenzo Cortes   : 1990  Diagnosis/ICD-10 Code:  Chronic pain of left knee [M25.562, G89.29]  Referring practitioner: Deven Muñoz MD  Date of Initial Visit: 2023  Today's Date: 2023  Patient seen for 1 session         Visit Diagnoses:    ICD-10-CM ICD-9-CM   1. Chronic pain of left knee  M25.562 719.46    G89.29 338.29         Subjective Evaluation    History of Present Illness  Mechanism of injury: Pt is coming to PT after a partial medial menisectomy on the left after dealing with chronic pain in the left knee after an injury in 2023. He underwent PT intervention prior to surgery, but did not have good results and eventually elected to have surgery.     Since the surgery he has been feeling good and he has been working on the exercises at home that were provided by his surgeon. He feels like he is ready to begin more intense exercise.     He used crutches the day of the surgery and since that time he has not had to use crutches. He feels some occasional tightness along the front of the knee if his leg rotates out. He also feels some tightness in the calf and has been working on getting this stretched out.     Quality of life: good    Pain  Current pain ratin  At best pain ratin  At worst pain ratin  Location: left knee  Quality: dull ache  Relieving factors: rest, change in position, ice and medications (ibuprofen)  Aggravating factors: ambulation  Progression: improved    Treatments  Previous treatment: physical therapy  Patient Goals  Patient goals for therapy: decreased pain, decreased edema, improved balance, increased motion, increased strength and return to sport/leisure activities           Objective          Palpation     Additional Palpation Details  Mild TTP noted at the left knee medial joint line     Active Range of Motion    Left Knee   Flexion: 134 degrees   Extension: 0 degrees     Strength/Myotome Testing     Left Hip   Planes of Motion   Flexion: 4+  Extension: 4-  Abduction: 4-    Right Hip   Planes of Motion   Flexion: 5  Extension: 4  Abduction: 4    Left Knee   Flexion: 4+  Extension: 4+    Right Knee   Flexion: 5  Extension: 5    Ambulation     Ambulation: Level Surfaces     Additional Level Surfaces Ambulation Details  Mild gait deviation noted with a decreased weight shift to the left during stance phase           Assessment & Plan       Assessment  Impairments: abnormal gait, abnormal muscle tone, abnormal or restricted ROM, activity intolerance, impaired balance, impaired physical strength, lacks appropriate home exercise program, pain with function and weight-bearing intolerance   Functional limitations: carrying objects, lifting, walking, uncomfortable because of pain, sitting and standing   Assessment details: Patient is a 33 year old male who comes to physical therapy s/p left knee medial menisectomy resulting in pain, decreased ROM, decreased strength, and inability to perform all essential functional activities. Pt will benefit from skilled PT services to address the above issues.     Prognosis details:   SHORT TERM GOALS:  2 weeks       1. Pt independent with HEP  2. Pt to demonstrate mar hip strength 4/5 or greater to improve stability with ambulation  3. Pt to report being able to walk for 10 minutes without increasing pain in the left knee    LONG TERM GOALS:   6 weeks  1. Pt to demonstrate ability to perform full functional squat with good form and control of the knees and without increasing pain  2. Pt to demonstrate mar hip strength to 4+/5 or greater to improve safety with ambulation on uneven surfaces  3. Pt to return to work full duty without increased pain in the left knee   4. Pt to demonstrate ability to perform step up/down 8 inch step x10 safely and without pain in the left knee       Plan  Therapy  options: will be seen for skilled therapy services  Planned modality interventions: cryotherapy, electrical stimulation/Russian stimulation, high voltage pulsed current (pain management), iontophoresis, microcurrent electrical stimulation, TENS, thermotherapy (hydrocollator packs) and ultrasound  Planned therapy interventions: abdominal trunk stabilization, ADL retraining, balance/weight-bearing training, body mechanics training, flexibility, functional ROM exercises, gait training, home exercise program, IADL retraining, joint mobilization, manual therapy, motor coordination training, neuromuscular re-education, soft tissue mobilization, strengthening, stretching and therapeutic activities  Frequency: 2x week  Duration in weeks: 12  Treatment plan discussed with: patient        History # of Personal Factors and/or Comorbidities: LOW (0)  Examination of Body System(s): # of elements: LOW (1-2)  Clinical Presentation: STABLE   Clinical Decision Making: LOW       Timed:         Manual Therapy:         mins  88883;     Therapeutic Exercise:    26     mins  74891;     Neuromuscular Anders:        mins  22819;    Therapeutic Activity:          mins  78683;     Gait Training:           mins  96328;     Ultrasound:          mins  66169;    Ionto                                   mins   50122  Self Care                            mins   69937  Canalith Repos         mins 83087      Un-Timed:  Electrical Stimulation:         mins  66922 ( );  Dry Needling          mins self-pay  Traction          mins 70674  Low Eval     25     Mins  69844  Mod Eval          Mins  82107  High Eval                            Mins  68555        Timed Treatment:   26   mins   Total Treatment:     51   mins          PT: Edin Watson PT, DPT, OCS, Cert. DN   License Number: [id number]  Electronically signed by Edin Watson PT, 11/20/23, 3:08 PM EST    Certification Period: 11/20/2023 thru 2/17/2024  I certify that the therapy services are  furnished while this patient is under my care.  The services outlined above are required by this patient, and will be reviewed every 90 days.         Physician Signature:__________________________________________________    PHYSICIAN: Deven Muñoz MD  NPI: [id number]                                      DATE:      Please sign and return via fax to [email or web address] . Thank you, [facility].

## 2023-11-28 ENCOUNTER — TREATMENT (OUTPATIENT)
Dept: PHYSICAL THERAPY | Facility: CLINIC | Age: 33
End: 2023-11-28
Payer: COMMERCIAL

## 2023-11-28 DIAGNOSIS — G89.29 CHRONIC PAIN OF LEFT KNEE: Primary | ICD-10-CM

## 2023-11-28 DIAGNOSIS — M25.562 CHRONIC PAIN OF LEFT KNEE: Primary | ICD-10-CM

## 2023-11-28 PROCEDURE — 97110 THERAPEUTIC EXERCISES: CPT | Performed by: PHYSICAL THERAPIST

## 2023-11-28 PROCEDURE — 97112 NEUROMUSCULAR REEDUCATION: CPT | Performed by: PHYSICAL THERAPIST

## 2023-11-28 PROCEDURE — 97530 THERAPEUTIC ACTIVITIES: CPT | Performed by: PHYSICAL THERAPIST

## 2023-11-28 NOTE — PROGRESS NOTES
Physical Therapy Daily Treatment Note    [facility]   [address], [address] [place] [address]      Patient: Lorenzo Cortes   : 1990  Referring practitioner: Deven Muñoz MD  Date of Initial Visit: Type: THERAPY  Noted: 2023  Today's Date: 2023  Patient seen for 2 sessions    Certification Period 2023 thru 2024       Visit Diagnoses:    ICD-10-CM ICD-9-CM   1. Chronic pain of left knee  M25.562 719.46    G89.29 338.29       Subjective     Patient states that he is feeling good overall although he continues to have some continued tightness and discomfort in the calf of the left leg.  He has been performing exercises at home with mild soreness and without any increase in pain in the knee joint itself.  Patient has been able to walk at home and in the community without exacerbation of symptoms.    Objective   See Exercise, Manual, and Modality Logs for complete treatment.       Assessment/Plan     Increase intensity of exercise today and addressed calf tightness with stretching and nerve glides.  Patient responded well to treatment and had some fatigue in the lower extremities but no increase in pain in the knee with increasing intensity of exercise.  Patient demonstrates improving quality of gait with more equal weight shift from side to side and terminal knee extension and knee flexion while ambulating.  Continue to progress as indicated.      Timed:         Manual Therapy:         mins  42562;     Therapeutic Exercise:    24     mins  72359;     Neuromuscular Anders:    15    mins  29333;    Therapeutic Activity:     15     mins  55016;     Gait Training:           mins  89599;     Ultrasound:          mins  42660;    Ionto                                   mins   38649  Self Care                            mins   93257  Canalith Repos         mins 09497  Electrical Stimulation:         mins  02618    Un-Timed:  Electrical Stimulation:         mins  73319 ( );  Dry  Needling          mins self-pay  Traction          mins 84713      Timed Treatment:   54   mins   Total Treatment:     54   mins    Edin Watson, PT, DPT, OCS, Cert. DN  [place] License: [id number]

## 2023-12-01 ENCOUNTER — TREATMENT (OUTPATIENT)
Dept: PHYSICAL THERAPY | Facility: CLINIC | Age: 33
End: 2023-12-01
Payer: COMMERCIAL

## 2023-12-01 DIAGNOSIS — G89.29 CHRONIC PAIN OF LEFT KNEE: Primary | ICD-10-CM

## 2023-12-01 DIAGNOSIS — M25.562 CHRONIC PAIN OF LEFT KNEE: Primary | ICD-10-CM

## 2023-12-01 PROCEDURE — 97112 NEUROMUSCULAR REEDUCATION: CPT | Performed by: PHYSICAL THERAPIST

## 2023-12-01 PROCEDURE — 97140 MANUAL THERAPY 1/> REGIONS: CPT | Performed by: PHYSICAL THERAPIST

## 2023-12-01 PROCEDURE — 97110 THERAPEUTIC EXERCISES: CPT | Performed by: PHYSICAL THERAPIST

## 2023-12-01 NOTE — PROGRESS NOTES
Physical Therapy Daily Treatment Note    [facility]   [address], [address] [place] [address]      Patient: Lorenzo Cortes   : 1990  Referring practitioner: Deven Muñoz MD  Date of Initial Visit: Type: THERAPY  Noted: 2023  Today's Date: 2023  Patient seen for 3 sessions    Certification Period 2023 thru 2024       Visit Diagnoses:    ICD-10-CM ICD-9-CM   1. Chronic pain of left knee  M25.562 719.46    G89.29 338.29       Subjective     Patient states that he had some soreness for a couple of days after his previous treatment, but that he did not feel like he had a significant increase in pain in the knee joint.  Patient continues to have some issues with tightness in the calf and hamstring although stretching and massage helps temporarily.  Patient reports compliance with his home exercise program.    Objective   See Exercise, Manual, and Modality Logs for complete treatment.       Assessment/Plan     Decrease intensity of some exercises today in the clinic and focus manual therapy intervention on soft tissue massage and tibiofemoral joint mobilization.  Patient responded very well to manual therapy and is able to complete exercise without exacerbation of symptoms.  Patient had some slight fatigue but his overall response to treatment was positive and I do not expect him to have excessive soreness.  We will continue to progress as indicated.      Timed:         Manual Therapy:    16     mins  06734;     Therapeutic Exercise:    30     mins  42687;     Neuromuscular Anders:    10    mins  19534;    Therapeutic Activity:          mins  32222;     Gait Training:           mins  38872;     Ultrasound:          mins  34094;    Ionto                                   mins   65322  Self Care                            mins   81599  Canalith Repos         mins 26502  Electrical Stimulation:         mins  91084    Un-Timed:  Electrical Stimulation:         mins  53979 (  );  Dry Needling          mins self-pay  Traction          mins 66367      Timed Treatment:   56   mins   Total Treatment:     56   mins    Edin Watson PT, DPT, OCS, Cert. DN  [place] License: [id number]

## 2023-12-05 ENCOUNTER — TREATMENT (OUTPATIENT)
Dept: PHYSICAL THERAPY | Facility: CLINIC | Age: 33
End: 2023-12-05
Payer: COMMERCIAL

## 2023-12-05 DIAGNOSIS — G89.29 CHRONIC PAIN OF LEFT KNEE: Primary | ICD-10-CM

## 2023-12-05 DIAGNOSIS — M25.562 CHRONIC PAIN OF LEFT KNEE: Primary | ICD-10-CM

## 2023-12-06 ENCOUNTER — OFFICE VISIT (OUTPATIENT)
Dept: ORTHOPEDIC SURGERY | Facility: CLINIC | Age: 33
End: 2023-12-06
Payer: COMMERCIAL

## 2023-12-06 DIAGNOSIS — Z98.890 S/P ARTHROSCOPIC PARTIAL MEDIAL MENISCECTOMY OF LEFT KNEE: Primary | ICD-10-CM

## 2023-12-06 DIAGNOSIS — Z87.828 S/P ARTHROSCOPIC PARTIAL MEDIAL MENISCECTOMY OF LEFT KNEE: Primary | ICD-10-CM

## 2023-12-06 NOTE — PROGRESS NOTES
Chief Complaint   Patient presents with    Post-op     3 week postop; S/P arthroscopic partial medial meniscectomy of left knee DOS: 11/7/23           HPI    He is doing better.  He tweaked his knee yesterday moving a piece of furniture.  He feels better this morning.    There were no vitals filed for this visit.      Physical Exam:  Left knee with full motion no swelling no effusion.  No tenderness.      X-RAY REPORT:  Imaging Results (Last 7 Days)       ** No results found for the last 168 hours. **                  ICD-10-CM ICD-9-CM   1. S/P arthroscopic partial medial meniscectomy of left knee  Z98.890 V45.89    Z87.828 V15.59     He is doing well and will follow-up as needed      Deven Muñoz M.D., [facility]  Orthopedic Surgeon  Fellowship Trained Sports Medicine  [facility]  Orthopedics and Sports Medicine  [address] [place] [address], [address]  [place] [address]      EMR Dragon/Transcription disclaimer:  Much of this encounter note is an electronic transcription of spoken language to printed text. Electronic transcription of spoken language may permit erroneous, or at times, nonsensical words or phrases to be inadvertently transcribed. Although I have reviewed the note for such errors, some may still exist.

## 2023-12-06 NOTE — PROGRESS NOTES
Physical Therapy Daily Treatment Note    [facility]   [address], [address] [place] [address]      Patient: Lorenzo Cortes   : 1990  Referring practitioner: Deven Muñoz MD  Date of Initial Visit: Type: THERAPY  Noted: 2023  Today's Date: 2023  Patient seen for 4 sessions    Certification Period 2023 thru 3/4/2024       Visit Diagnoses:    ICD-10-CM ICD-9-CM   1. Chronic pain of left knee  M25.562 719.46    G89.29 338.29       Subjective     Patient states after his previous session he had a decrease in tightness in the left calf and hamstring.  He does continue to still have some soreness in those areas but he does not feel limitation because of the tightness/soreness.  Patient reports compliance with his home exercise program and states that he is able to perform exercises at home without increasing pain or without excessive fatigue in the knee.    Objective   See Exercise, Manual, and Modality Logs for complete treatment.       Assessment/Plan     Patient is progressing well he demonstrates an improvement in his left lower extremity strength and in his tolerance to resisted exercise in standing activity.  Patient does not have any range of motion limitations at this time and he is improving with his ability to perform all functional activities.  We will continue to progress as indicated.      Timed:         Manual Therapy:    10     mins  43094;     Therapeutic Exercise:    14     mins  43946;     Neuromuscular Anders:        mins  20631;    Therapeutic Activity:     32     mins  90817;     Gait Training:           mins  44491;     Ultrasound:          mins  19123;    Ionto                                   mins   50769  Self Care                            mins   09885  Canalith Repos         mins 75274  Electrical Stimulation:         mins  27345    Un-Timed:  Electrical Stimulation:         mins  29683 ( );  Dry Needling          mins self-pay  Traction           mins [id number]      Timed Treatment:   56   mins   Total Treatment:     56   mins    Edin Watson, PT, DPT, OCS, Cert. DN  [place] License: [id number]

## 2023-12-07 ENCOUNTER — TELEPHONE (OUTPATIENT)
Dept: PHYSICAL THERAPY | Facility: CLINIC | Age: 33
End: 2023-12-07

## 2023-12-07 NOTE — TELEPHONE ENCOUNTER
Caller: Lorenzo Cortes    Relationship: Self       What was the call regarding: STOMACH ISSUES

## 2023-12-12 ENCOUNTER — TREATMENT (OUTPATIENT)
Dept: PHYSICAL THERAPY | Facility: CLINIC | Age: 33
End: 2023-12-12
Payer: COMMERCIAL

## 2023-12-12 DIAGNOSIS — M25.562 CHRONIC PAIN OF LEFT KNEE: Primary | ICD-10-CM

## 2023-12-12 DIAGNOSIS — G89.29 CHRONIC PAIN OF LEFT KNEE: Primary | ICD-10-CM

## 2023-12-15 ENCOUNTER — TREATMENT (OUTPATIENT)
Dept: PHYSICAL THERAPY | Facility: CLINIC | Age: 33
End: 2023-12-15
Payer: COMMERCIAL

## 2023-12-15 DIAGNOSIS — M25.562 CHRONIC PAIN OF LEFT KNEE: Primary | ICD-10-CM

## 2023-12-15 DIAGNOSIS — G89.29 CHRONIC PAIN OF LEFT KNEE: Primary | ICD-10-CM

## 2023-12-15 PROCEDURE — 97112 NEUROMUSCULAR REEDUCATION: CPT | Performed by: PHYSICAL THERAPIST

## 2023-12-15 PROCEDURE — 97110 THERAPEUTIC EXERCISES: CPT | Performed by: PHYSICAL THERAPIST

## 2023-12-15 PROCEDURE — 97530 THERAPEUTIC ACTIVITIES: CPT | Performed by: PHYSICAL THERAPIST

## 2023-12-17 NOTE — PROGRESS NOTES
Physical Therapy Daily Treatment Note    [facility]   [address] [place] [address]      Patient: Lorenzo Cortes   : 1990  Referring practitioner: Deven Muñoz MD  Date of Initial Visit: Type: THERAPY  Noted: 2023  Today's Date: 2023  Patient seen for 5 sessions    Certification Period 2023 thru 3/14/2024       Visit Diagnoses:    ICD-10-CM ICD-9-CM   1. Chronic pain of left knee  M25.562 719.46    G89.29 338.29       Subjective     Patient states that he feels that he is making good progress with therapy and there is able to do more activity outside of the clinic without soreness or swelling in the left knee.  Patient reports compliance with his home exercise program.    Objective   See Exercise, Manual, and Modality Logs for complete treatment.       Assessment/Plan     Patient is progressing very well and he demonstrates a significant improvement in his overall strength and control left knee.  Patient is able to perform functional activity in the clinic demonstrating good stability overall strength will continue to progress as indicated.      Timed:         Manual Therapy:    10    mins  35999;     Therapeutic Exercise:    13     mins  42340;     Neuromuscular Anders:        mins  54994;    Therapeutic Activity:     30     mins  79260;     Gait Training:           mins  58637;     Ultrasound:          mins  42819;    Ionto                                   mins   91829  Self Care                            mins   93724  Canalith Repos         mins 89720  Electrical Stimulation:         mins  83165    Un-Timed:  Electrical Stimulation:         mins  12090 (MC );  Dry Needling          mins self-pay  Traction          mins 04791      Timed Treatment:   53   mins   Total Treatment:     53   mins    Edin Watson PT, DPT, OCS, Cert. DN  [place] License: [id number]

## 2023-12-18 ENCOUNTER — OFFICE VISIT (OUTPATIENT)
Dept: INTERNAL MEDICINE | Facility: CLINIC | Age: 33
End: 2023-12-18
Payer: COMMERCIAL

## 2023-12-18 VITALS
TEMPERATURE: 97.3 F | HEIGHT: 71 IN | WEIGHT: 250.38 LBS | HEART RATE: 100 BPM | BODY MASS INDEX: 35.05 KG/M2 | RESPIRATION RATE: 20 BRPM | SYSTOLIC BLOOD PRESSURE: 130 MMHG | DIASTOLIC BLOOD PRESSURE: 82 MMHG

## 2023-12-18 DIAGNOSIS — E66.09 CLASS 1 OBESITY DUE TO EXCESS CALORIES WITHOUT SERIOUS COMORBIDITY WITH BODY MASS INDEX (BMI) OF 34.0 TO 34.9 IN ADULT: ICD-10-CM

## 2023-12-18 DIAGNOSIS — B00.9 HERPES SIMPLEX: ICD-10-CM

## 2023-12-18 DIAGNOSIS — Z00.00 ROUTINE HEALTH MAINTENANCE: Primary | ICD-10-CM

## 2023-12-18 DIAGNOSIS — F17.210 CIGARETTE NICOTINE DEPENDENCE WITHOUT COMPLICATION: ICD-10-CM

## 2023-12-18 DIAGNOSIS — F12.90 MARIJUANA USE: ICD-10-CM

## 2023-12-18 DIAGNOSIS — I10 PRIMARY HYPERTENSION: ICD-10-CM

## 2023-12-18 DIAGNOSIS — Z13.6 SCREENING FOR ISCHEMIC HEART DISEASE: ICD-10-CM

## 2023-12-18 DIAGNOSIS — Z23 ENCOUNTER FOR VACCINATION: ICD-10-CM

## 2023-12-18 PROBLEM — M25.562 ACUTE PAIN OF LEFT KNEE: Status: RESOLVED | Noted: 2023-09-05 | Resolved: 2023-12-18

## 2023-12-18 PROCEDURE — 99395 PREV VISIT EST AGE 18-39: CPT | Performed by: STUDENT IN AN ORGANIZED HEALTH CARE EDUCATION/TRAINING PROGRAM

## 2023-12-18 NOTE — PATIENT INSTRUCTIONS
Health Maintenance, Male  A healthy lifestyle and preventive care is important for your health and wellness. Ask your health care provider about what schedule of regular examinations is right for you.  What should I know about weight and diet?    Eat a Healthy Diet  Eat plenty of vegetables, fruits, whole grains, low-fat dairy products, and lean protein.  Do not eat a lot of foods high in solid fats, added sugars, or salt.     Maintain a Healthy Weight  Regular exercise can help you achieve or maintain a healthy weight. You should:  Do at least 150 minutes of exercise each week. The exercise should increase your heart rate and make you sweat (moderate-intensity exercise).  Do strength-training exercises at least twice a week.     Watch Your Levels of Cholesterol and Blood Lipids  Have your blood tested for lipids and cholesterol every 5 years starting at 35 years of age. If you are at high risk for heart disease, you should start having your blood tested when you are 20 years old. You may need to have your cholesterol levels checked more often if:  Your lipid or cholesterol levels are high.  You are older than 50 years of age.  You are at high risk for heart disease.     What should I know about cancer screening?  Many types of cancers can be detected early and may often be prevented.  Lung Cancer  You should be screened every year for lung cancer if:  You are a current smoker who has smoked for at least 30 years.  You are a former smoker who has quit within the past 15 years.  Talk to your health care provider about your screening options, when you should start screening, and how often you should be screened.     Colorectal Cancer  Routine colorectal cancer screening usually begins at 50 years of age and should be repeated every 5-10 years until you are 75 years old. You may need to be screened more often if early forms of precancerous polyps or small growths are found. Your health care provider may recommend  screening at an earlier age if you have risk factors for colon cancer.  Your health care provider may recommend using home test kits to check for hidden blood in the stool.  A small camera at the end of a tube can be used to examine your colon (sigmoidoscopy or colonoscopy). This checks for the earliest forms of colorectal cancer.     Prostate and Testicular Cancer  Depending on your age and overall health, your health care provider may do certain tests to screen for prostate and testicular cancer.  Talk to your health care provider about any symptoms or concerns you have about testicular or prostate cancer.     Skin Cancer  Check your skin from head to toe regularly.  Tell your health care provider about any new moles or changes in moles, especially if:  There is a change in a mole’s size, shape, or color.  You have a mole that is larger than a pencil eraser.  Always use sunscreen. Apply sunscreen liberally and repeat throughout the day.  Protect yourself by wearing long sleeves, pants, a wide-brimmed hat, and sunglasses when outside.     What should I know about heart disease, diabetes, and high blood pressure?  If you are 18-39 years of age, have your blood pressure checked every 3-5 years. If you are 40 years of age or older, have your blood pressure checked every year. You should have your blood pressure measured twice--once when you are at a hospital or clinic, and once when you are not at a hospital or clinic. Record the average of the two measurements. To check your blood pressure when you are not at a hospital or clinic, you can use:  An automated blood pressure machine at a pharmacy.  A home blood pressure monitor.  Talk to your health care provider about your target blood pressure.  If you are between 45-79 years old, ask your health care provider if you should take aspirin to prevent heart disease.  Have regular diabetes screenings by checking your fasting blood sugar level.  If you are at a normal  weight and have a low risk for diabetes, have this test once every three years after the age of 45.  If you are overweight and have a high risk for diabetes, consider being tested at a younger age or more often.  A one-time screening for abdominal aortic aneurysm (AAA) by ultrasound is recommended for men aged 65-75 years who are current or former smokers.  What should I know about preventing infection?  Hepatitis B  If you have a higher risk for hepatitis B, you should be screened for this virus. Talk with your health care provider to find out if you are at risk for hepatitis B infection.  Hepatitis C  Blood testing is recommended for:  Everyone born from 1945 through 1965.  Anyone with known risk factors for hepatitis C.     Sexually Transmitted Diseases (STDs)  You should be screened each year for STDs including gonorrhea and chlamydia if:  You are sexually active and are younger than 24 years of age.  You are older than 24 years of age and your health care provider tells you that you are at risk for this type of infection.  Your sexual activity has changed since you were last screened and you are at an increased risk for chlamydia or gonorrhea. Ask your health care provider if you are at risk.  Talk with your health care provider about whether you are at high risk of being infected with HIV. Your health care provider may recommend a prescription medicine to help prevent HIV infection.     What else can I do?    Schedule regular health, dental, and eye exams.  Stay current with your vaccines (immunizations).  Do not use any tobacco products, such as cigarettes, chewing tobacco, and e-cigarettes. If you need help quitting, ask your health care provider.  Limit alcohol intake to no more than 2 drinks per day. One drink equals 12 ounces of beer, 5 ounces of wine, or 1½ ounces of hard liquor.  Do not use street drugs.  Do not share needles.  Ask your health care provider for help if you need support or information  about quitting drugs.  Tell your health care provider if you often feel depressed.  Tell your health care provider if you have ever been abused or do not feel safe at home.      This information is not intended to replace advice given to you by your health care provider. Make sure you discuss any questions you have with your health care provider.  Document Released: 06/15/2009 Document Revised: 08/16/2017 Document Reviewed: 09/20/2016  [employer] Patient Education © 2018 [employer]    Healthy Eating  Following a healthy eating pattern may help you to achieve and maintain a healthy body weight, reduce the risk of chronic disease, and live a long and productive life. It is important to follow a healthy eating pattern at an appropriate calorie level for your body. Your nutritional needs should be met primarily through food by choosing a variety of nutrient-rich foods.  What are tips for following this plan?  Reading food labels  Read labels and choose the following:  Reduced or low sodium.  Juices with 100% fruit juice.  Foods with low saturated fats and high polyunsaturated and monounsaturated fats.  Foods with whole grains, such as whole wheat, cracked wheat, brown rice, and wild rice.  Whole grains that are fortified with folic acid. This is recommended for women who are pregnant or who want to become pregnant.  Read labels and avoid the following:  Foods with a lot of added sugars. These include foods that contain brown sugar, corn sweetener, corn syrup, dextrose, fructose, glucose, high-fructose corn syrup, honey, invert sugar, lactose, malt syrup, maltose, molasses, raw sugar, sucrose, trehalose, or turbinado sugar.  Do not eat more than the following amounts of added sugar per day:  6 teaspoons (25 g) for women.  9 teaspoons (38 g) for men.  Foods that contain processed or refined starches and grains.  Refined grain products, such as white flour, degermed cornmeal, white bread, and white  "rice.  Shopping  Choose nutrient-rich snacks, such as vegetables, whole fruits, and nuts. Avoid high-calorie and high-sugar snacks, such as potato chips, fruit snacks, and candy.  Use oil-based dressings and spreads on foods instead of solid fats such as butter, stick margarine, or cream cheese.  Limit pre-made sauces, mixes, and \"instant\" products such as flavored rice, instant noodles, and ready-made pasta.  Try more plant-protein sources, such as tofu, tempeh, black beans, edamame, lentils, nuts, and seeds.  Explore eating plans such as the Mediterranean diet or vegetarian diet.  Cooking  Use oil to sauté or stir-khoury foods instead of solid fats such as butter, stick margarine, or lard.  Try baking, boiling, grilling, or broiling instead of frying.  Remove the fatty part of meats before cooking.  Steam vegetables in water or broth.  Meal planning    At meals, imagine dividing your plate into fourths:  One-half of your plate is fruits and vegetables.  One-fourth of your plate is whole grains.  One-fourth of your plate is protein, especially lean meats, poultry, eggs, tofu, beans, or nuts.  Include low-fat dairy as part of your daily diet.     Lifestyle  Choose healthy options in all settings, including home, work, school, restaurants, or stores.  Prepare your food safely:  Wash your hands after handling raw meats.  Keep food preparation surfaces clean by regularly washing with hot, soapy water.  Keep raw meats separate from ready-to-eat foods, such as fruits and vegetables.  , meat, poultry, and eggs to the recommended internal temperature.  Store foods at safe temperatures. In general:  Keep cold foods at 40°F (4.4°C) or below.  Keep hot foods at 140°F (60°C) or above.  Keep your freezer at 0°F (-17.8°C) or below.  Foods are no longer safe to eat when they have been between the temperatures of 40°-140°F (4.4-60°C) for more than 2 hours.  What foods should I eat?  Fruits  Aim to eat 2 cup-equivalents of " fresh, canned (in natural juice), or frozen fruits each day. Examples of 1 cup-equivalent of fruit include 1 small apple, 8 large strawberries, 1 cup canned fruit, ½ cup dried fruit, or 1 cup 100% juice.  Vegetables  Aim to eat 2½-3 cup-equivalents of fresh and frozen vegetables each day, including different varieties and colors. Examples of 1 cup-equivalent of vegetables include 2 medium carrots, 2 cups raw, leafy greens, 1 cup chopped vegetable (raw or cooked), or 1 medium baked potato.  Grains  Aim to eat 6 ounce-equivalents of whole grains each day. Examples of 1 ounce-equivalent of grains include 1 slice of bread, 1 cup ready-to-eat cereal, 3 cups popcorn, or ½ cup cooked rice, pasta, or cereal.  Meats and other proteins  Aim to eat 5-6 ounce-equivalents of protein each day. Examples of 1 ounce-equivalent of protein include 1 egg, 1/2 cup nuts or seeds, or 1 tablespoon (16 g) peanut butter. A cut of meat or fish that is the size of a deck of cards is about 3-4 ounce-equivalents.  Of the protein you eat each week, try to have at least 8 ounces come from seafood. This includes salmon, trout, herring, and anchovies.  Dairy  Aim to eat 3 cup-equivalents of fat-free or low-fat dairy each day. Examples of 1 cup-equivalent of dairy include 1 cup (240 mL) milk, 8 ounces (250 g) yogurt, 1½ ounces (44 g) natural cheese, or 1 cup (240 mL) fortified soy milk.  Fats and oils  Aim for about 5 teaspoons (21 g) per day. Choose monounsaturated fats, such as canola and olive oils, avocados, peanut butter, and most nuts, or polyunsaturated fats, such as sunflower, corn, and soybean oils, walnuts, pine nuts, sesame seeds, sunflower seeds, and flaxseed.  Beverages  Aim for six 8-oz glasses of water per day. Limit coffee to three to five 8-oz cups per day.  Limit caffeinated beverages that have added calories, such as soda and energy drinks.  Limit alcohol intake to no more than 1 drink a day for nonpregnant women and 2 drinks a day  for men. One drink equals 12 oz of beer (355 mL), 5 oz of wine (148 mL), or 1½ oz of hard liquor (44 mL).  Seasoning and other foods  Avoid adding excess amounts of salt to your foods. Try flavoring foods with herbs and spices instead of salt.  Avoid adding sugar to foods.  Try using oil-based dressings, sauces, and spreads instead of solid fats.  This information is based on general [place] nutrition guidelines. For more information, visit choosemyplate.gov. Exact amounts may vary based on your nutrition needs.  Summary  A healthy eating plan may help you to maintain a healthy weight, reduce the risk of chronic diseases, and stay active throughout your life.  Plan your meals. Make sure you eat the right portions of a variety of nutrient-rich foods.  Try baking, boiling, grilling, or broiling instead of frying.  Choose healthy options in all settings, including home, work, school, restaurants, or stores.  This information is not intended to replace advice given to you by your health care provider. Make sure you discuss any questions you have with your health care provider.  Document Revised: 04/01/2019 Document Reviewed: 04/01/2019  [employer] Patient Education © 2021 [employer]    Exercising to Stay Healthy  To become healthy and stay healthy, it is recommended that you do moderate-intensity and vigorous-intensity exercise. You can tell that you are exercising at a moderate intensity if your heart starts beating faster and you start breathing faster but can still hold a conversation. You can tell that you are exercising at a vigorous intensity if you are breathing much harder and faster and cannot hold a conversation while exercising.  Exercising regularly is important. It has many health benefits, such as:  Improving overall fitness, flexibility, and endurance.  Increasing bone density.  Helping with weight control.  Decreasing body fat.  Increasing muscle strength.  Reducing stress and tension.  Improving overall  health.  How often should I exercise?  Choose an activity that you enjoy, and set realistic goals. Your health care provider can help you make an activity plan that works for you.  Exercise regularly as told by your health care provider. This may include:  Doing strength training two times a week, such as:  Lifting weights.  Using resistance bands.  Push-ups.  Sit-ups.  Yoga.  Doing a certain intensity of exercise for a given amount of time. Choose from these options:  A total of 150 minutes of moderate-intensity exercise every week.  A total of 75 minutes of vigorous-intensity exercise every week.  A mix of moderate-intensity and vigorous-intensity exercise every week.  Children, pregnant women, people who have not exercised regularly, people who are overweight, and older adults may need to talk with a health care provider about what activities are safe to do. If you have a medical condition, be sure to talk with your health care provider before you start a new exercise program.  What are some exercise ideas?    Moderate-intensity exercise ideas include:  Walking 1 mile (1.6 km) in about 15 minutes.  Biking.  Hiking.  Golfing.  Dancing.  Water aerobics.  Vigorous-intensity exercise ideas include:  Walking 4.5 miles (7.2 km) or more in about 1 hour.  Jogging or running 5 miles (8 km) in about 1 hour.  Biking 10 miles (16.1 km) or more in about 1 hour.  Lap swimming.  Roller-skating or in-line skating.  Cross-country skiing.  Vigorous competitive sports, such as football, basketball, and soccer.  Jumping rope.  Aerobic dancing.  What are some everyday activities that can help me to get exercise?  Yard work, such as:  Pushing a .  Raking and bagging leaves.  Washing your car.  Pushing a stroller.  Shoveling snow.  Gardening.  Washing windows or floors.  How can I be more active in my day-to-day activities?  Use stairs instead of an elevator.  Take a walk during your lunch break.  If you drive, park your car  farther away from your work or school.  If you take public transportation, get off one stop early and walk the rest of the way.  Stand up or walk around during all of your indoor phone calls.  Get up, stretch, and walk around every 30 minutes throughout the day.  Enjoy exercise with a friend. Support to continue exercising will help you keep a regular routine of activity.  What guidelines can I follow while exercising?  Before you start a new exercise program, talk with your health care provider.  Do not exercise so much that you hurt yourself, feel dizzy, or get very short of breath.  Wear comfortable clothes and wear shoes with good support.  Drink plenty of water while you exercise to prevent dehydration or heat stroke.  Work out until your breathing and your heartbeat get faster.  Where to find more information  [place].S[place] Department of Health and Human Services: www.hhs.gov  Centers for Disease Control and Prevention (CDC): www.cdc.gov  Summary  Exercising regularly is important. It will improve your overall fitness, flexibility, and endurance.  Regular exercise also will improve your overall health. It can help you control your weight, reduce stress, and improve your bone density.  Do not exercise so much that you hurt yourself, feel dizzy, or get very short of breath.  Before you start a new exercise program, talk with your health care provider.  This information is not intended to replace advice given to you by your health care provider. Make sure you discuss any questions you have with your health care provider.  Document Revised: 11/30/2018 Document Reviewed: 11/08/2018  [email or web address] Patient Education © 2021 [employer]

## 2023-12-18 NOTE — LETTER
[facility]  Vaccine Consent Form    Patient Name:  Lorenzo Cortes  Patient :  1990     Vaccine(s) Ordered    Tdap Vaccine Greater Than or Equal To 6yo IM        Screening Checklist  The following questions should be completed prior to vaccination. If you answer “yes” to any question, it does not necessarily mean you should not be vaccinated. It just means we may need to clarify or ask more questions. If a question is unclear, please ask your healthcare provider to explain it.    Yes No   Any fever or moderate to severe illness today (mild illness and/or antibiotic treatment are not contraindications)?     Do you have a history of a serious reaction to any previous vaccinations, such as anaphylaxis, encephalopathy within 7 days, Guillain-[place] syndrome within 6 weeks, seizure?     Have you received any live vaccine(s) (e.g MMR, GILMER) or any other vaccines in the last month (to ensure duplicate doses aren't given)?     Do you have an anaphylactic allergy to latex (DTaP, DTaP-IPV, Hep A, Hep B, MenB, RV, Td, Tdap), baker’s yeast (Hep B, HPV), polysorbates (RSV, nirsevimab, PCV 20, Rotavirrus, Tdap, Shingrix), or gelatin (GILMER, MMR)?     Do you have an anaphylactic allergy to neomycin (Rabies, GILMER, MMR, IPV, Hep A), polymyxin B (IPV), or streptomycin (IPV)?      Any cancer, leukemia, AIDS, or other immune system disorder? (GILMER, MMR, RV)     Do you have a parent, brother, or sister with an immune system problem (if immune competence of vaccine recipient clinically verified, can proceed)? (MMR, GILMER)     Any recent steroid treatments for >2 weeks, chemotherapy, or radiation treatment? (GILMER, MMR)     Have you received antibody-containing blood transfusions or IVIG in the past 11 months (recommended interval is dependent on product)? (MMR, GILMER)     Have you taken antiviral drugs (acyclovir, famciclovir, valacyclovir for GILMER) in the last 24 or 48 hours, respectively?      Are you pregnant or planning to become  pregnant within 1 month? (GILMER, MMR, HPV, IPV, MenB, Abrexvy; For Hep B- refer to Engerix-B; For RSV - Abrysvo is indicated for 32-36 weeks of pregnancy from September to January)     For infants, have you ever been told your child has had intussusception or a medical emergency involving obstruction of the intestine (Rotavirus)? If not for an infant, can skip this question.         *Ordering Physician/APC should be consulted if “yes” is checked by the patient or guardian above.      I have received, read, and understand the Vaccine Information Statement (VIS) for each vaccine ordered above.  I have considered my health status as well as the health status of my close contacts.  I have taken the opportunity to discuss my vaccine questions with my health care provider.   I have requested that the ordered vaccine(s) be given to me.  I understand the benefits and risks of the vaccines.  I understand that I should remain in the clinic for 15 minutes after receiving the vaccine(s).  _________________________________________________________  Signature of Patient or Parent/Legal Guardian ____________________  Date

## 2023-12-18 NOTE — ASSESSMENT & PLAN NOTE
Hypertension is improving with lifestyle modifications.  Weight loss.  Regular aerobic exercise.  Stop smoking.  Blood pressure will be reassessed at the next regular appointment.

## 2023-12-18 NOTE — ASSESSMENT & PLAN NOTE
Tobacco use is improving with lifestyle modifications.  Smoking cessation counseling was provided.  Tobacco use will be reassessed at the next regular appointment. Continue nicotine replacement therapy.
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
## 2023-12-19 NOTE — PROGRESS NOTES
Physical Therapy Daily Treatment Note    [facility]   [address], [address] [place] [address]      Patient: Lorenzo Cortes   : 1990  Referring practitioner: Deven Muñoz MD  Date of Initial Visit: Type: THERAPY  Noted: 2023  Today's Date: 2023  Patient seen for 6 sessions    Certification Period 2023 thru 3/16/2024       Visit Diagnoses:    ICD-10-CM ICD-9-CM   1. Chronic pain of left knee  M25.562 719.46    G89.29 338.29       Subjective     Pt states that he is feeling good overall and he denies any specific pain in the knee. He continues to have some fatigue with prolonged activity and soreness after increasing intensity of exercise.     Objective   See Exercise, Manual, and Modality Logs for complete treatment.       Assessment/Plan     Pt is progressing well and he demonstrates an improvement in his overall strength and tolerance to activity in the clinic. Will cont to progress functional activity as indicated.       Timed:         Manual Therapy:         mins  38970;     Therapeutic Exercise:    13     mins  11628;     Neuromuscular Anders:    13    mins  72088;    Therapeutic Activity:     28     mins  79121;     Gait Training:           mins  71076;     Ultrasound:          mins  15331;    Ionto                                   mins   13250  Self Care                            mins   75788  Canalith Repos         mins 22219  Electrical Stimulation:         mins  69203    Un-Timed:  Electrical Stimulation:         mins  80907 ( );  Dry Needling          mins self-pay  Traction          mins 30903      Timed Treatment:   54   mins   Total Treatment:     54   mins    Edin Watson, PT, DPT, OCS, Cert. DN  [place] License: [id number]

## 2023-12-27 ENCOUNTER — TREATMENT (OUTPATIENT)
Dept: PHYSICAL THERAPY | Facility: CLINIC | Age: 33
End: 2023-12-27
Payer: COMMERCIAL

## 2023-12-27 DIAGNOSIS — M25.562 CHRONIC PAIN OF LEFT KNEE: Primary | ICD-10-CM

## 2023-12-27 DIAGNOSIS — G89.29 CHRONIC PAIN OF LEFT KNEE: Primary | ICD-10-CM

## 2023-12-27 PROCEDURE — 97110 THERAPEUTIC EXERCISES: CPT | Performed by: PHYSICAL THERAPIST

## 2023-12-27 PROCEDURE — 97530 THERAPEUTIC ACTIVITIES: CPT | Performed by: PHYSICAL THERAPIST

## 2023-12-27 PROCEDURE — 97112 NEUROMUSCULAR REEDUCATION: CPT | Performed by: PHYSICAL THERAPIST

## 2023-12-27 NOTE — PROGRESS NOTES
Physical Therapy Daily Treatment Note    [facility]   [address], [address] [place] [address]      Patient: Lorenzo Cortes   : 1990  Referring practitioner: Deven Muñoz MD  Date of Initial Visit: Type: THERAPY  Noted: 2023  Today's Date: 2023  Patient seen for 7 sessions    Certification Period 2023 thru 3/25/2024       Visit Diagnoses:    ICD-10-CM ICD-9-CM   1. Chronic pain of left knee  M25.562 719.46    G89.29 338.29       Subjective     Pt states that he is feeling good and feels improved over the past several weeks. At this point he is only having discomfort intermittently when the weather changes and when he is kneeling or getting into bed with pressure on the left patella.     Objective   See Exercise, Manual, and Modality Logs for complete treatment.       Assessment/Plan     Pt is progressing well and he is able to complete all exercise in the clinic without exacerbation of symptoms in the knee. He does not report excessive fatigue and is able to perform activities without loss of form. Will cont to progress as indicated.       Timed:         Manual Therapy:         mins  25901;     Therapeutic Exercise:    14     mins  14809;     Neuromuscular Anders:    14    mins  42749;    Therapeutic Activity:     28     mins  02269;     Gait Training:           mins  48696;     Ultrasound:          mins  46055;    Ionto                                   mins   45591  Self Care                            mins   37229  Canalith Repos         mins 21148  Electrical Stimulation:         mins  14273    Un-Timed:  Electrical Stimulation:         mins  91858 ( );  Dry Needling          mins self-pay  Traction          mins 69545      Timed Treatment:   56   mins   Total Treatment:     56   mins    Edin Watson, PT, DPT, OCS, Cert. DN  [place] License: [id number]

## 2023-12-29 ENCOUNTER — TREATMENT (OUTPATIENT)
Dept: PHYSICAL THERAPY | Facility: CLINIC | Age: 33
End: 2023-12-29
Payer: COMMERCIAL

## 2023-12-29 DIAGNOSIS — G89.29 CHRONIC PAIN OF LEFT KNEE: Primary | ICD-10-CM

## 2023-12-29 DIAGNOSIS — M25.562 CHRONIC PAIN OF LEFT KNEE: Primary | ICD-10-CM

## 2023-12-29 PROCEDURE — 97112 NEUROMUSCULAR REEDUCATION: CPT | Performed by: PHYSICAL THERAPIST

## 2023-12-29 PROCEDURE — 97110 THERAPEUTIC EXERCISES: CPT | Performed by: PHYSICAL THERAPIST

## 2023-12-29 PROCEDURE — 97530 THERAPEUTIC ACTIVITIES: CPT | Performed by: PHYSICAL THERAPIST

## 2023-12-29 NOTE — PROGRESS NOTES
Physical Therapy Daily Treatment Note    [facility]   [address], [address] [place] [address]      Patient: Lorenzo Cortes   : 1990  Referring practitioner: Deven Muñoz MD  Date of Initial Visit: Type: THERAPY  Noted: 2023  Today's Date: 2023  Patient seen for 8 sessions    Certification Period 2023 thru 3/27/2024       Visit Diagnoses:    ICD-10-CM ICD-9-CM   1. Chronic pain of left knee  M25.562 719.46    G89.29 338.29       Subjective     Pt states that he is feeling good overall and he denies any pain in the knee at this time. He continues to have some discomfort with kneeling and feels some weakness in the left leg compared to the right.     Objective   See Exercise, Manual, and Modality Logs for complete treatment.       Assessment/Plan     Pt is progressing well and he was able to perform high level dynamic activity in the clinic without exacerbation of symptoms or instability. Will continue to progress strengthening and functional activity as indicated.       Timed:         Manual Therapy:         mins  87011;     Therapeutic Exercise:    14     mins  69109;     Neuromuscular Anders:    10    mins  03597;    Therapeutic Activity:     30     mins  69308;     Gait Training:           mins  83389;     Ultrasound:          mins  42369;    Ionto                                   mins   79254  Self Care                            mins   59655  Canalith Repos         mins 44323  Electrical Stimulation:         mins  24959    Un-Timed:  Electrical Stimulation:         mins  01107 ( );  Dry Needling          mins self-pay  Traction          mins 43849      Timed Treatment:   54   mins   Total Treatment:     54   mins    Edin Watson, PT, DPT, OCS, Cert. DN  [place] License: [id number]

## 2024-01-03 ENCOUNTER — TREATMENT (OUTPATIENT)
Dept: PHYSICAL THERAPY | Facility: CLINIC | Age: 34
End: 2024-01-03
Payer: COMMERCIAL

## 2024-01-03 DIAGNOSIS — G89.29 CHRONIC PAIN OF LEFT KNEE: Primary | ICD-10-CM

## 2024-01-03 DIAGNOSIS — M25.562 CHRONIC PAIN OF LEFT KNEE: Primary | ICD-10-CM

## 2024-01-03 PROCEDURE — 97530 THERAPEUTIC ACTIVITIES: CPT | Performed by: PHYSICAL THERAPIST

## 2024-01-03 PROCEDURE — 97110 THERAPEUTIC EXERCISES: CPT | Performed by: PHYSICAL THERAPIST

## 2024-01-03 PROCEDURE — 97112 NEUROMUSCULAR REEDUCATION: CPT | Performed by: PHYSICAL THERAPIST

## 2024-01-04 NOTE — PROGRESS NOTES
Physical Therapy Daily Treatment Note    [facility]   [address], [address] [place] [address]      Patient: Lorenzo Cortes   : 1990  Referring practitioner: Deven Muñoz MD  Date of Initial Visit: Type: THERAPY  Noted: 2023  Today's Date: 2024  Patient seen for 9 sessions    Certification Period 2024 thru 2024       Visit Diagnoses:    ICD-10-CM ICD-9-CM   1. Chronic pain of left knee  M25.562 719.46    G89.29 338.29       Subjective     Patient says he feels like he is making very good progress and he was able to walk on a treadmill at an incline for 30 minutes without exacerbation of symptoms in the left knee.  Patient has been consistent with his home exercise program and he demonstrates good understanding of how to progress strengthening and functional activity.    Objective   See Exercise, Manual, and Modality Logs for complete treatment.       Assessment/Plan     Increase intensity of activity in clinic today with progression to more functional, standing, and dynamic activity.  Patient is to perform exercise without exacerbation of symptoms in the knees although he did have some overall generalized fatigue.  Will continue to progress activity as indicated.      Timed:         Manual Therapy:         mins  52125;     Therapeutic Exercise:    14     mins  54117;     Neuromuscular Anders:    15    mins  80513;    Therapeutic Activity:     30     mins  05556;     Gait Training:           mins  23873;     Ultrasound:          mins  34117;    Ionto                                   mins   25998  Self Care                            mins   72615  Canalith Repos         mins 49028  Electrical Stimulation:         mins  22549    Un-Timed:  Electrical Stimulation:         mins  15015 ( );  Dry Needling          mins self-pay  Traction          mins 60905      Timed Treatment:   59   mins   Total Treatment:     59   mins    Edin Watson, PT, DPT, OCS, Cert. DN  [place] License:  [id number]

## 2024-01-22 ENCOUNTER — PATIENT MESSAGE (OUTPATIENT)
Dept: INTERNAL MEDICINE | Facility: CLINIC | Age: 34
End: 2024-01-22
Payer: COMMERCIAL

## 2024-01-22 NOTE — TELEPHONE ENCOUNTER
From: Lorenzo Cortes  To: Chava Ramos  Sent: 1/22/2024 2:46 PM EST  Subject: Blood work and [employer]    Hey Dr. RAMOS. I do still need to do my blood work. My employer has a wellness program that will discount my insurance based on earned points each year and one of the big items is the lab work. Does [facility] or your office know if they can be submitted to [employer] or should I get the blood work done where I know it'll work and send it to you all?    Thank you

## 2024-01-26 ENCOUNTER — TREATMENT (OUTPATIENT)
Dept: PHYSICAL THERAPY | Facility: CLINIC | Age: 34
End: 2024-01-26
Payer: COMMERCIAL

## 2024-01-26 DIAGNOSIS — M25.562 CHRONIC PAIN OF LEFT KNEE: Primary | ICD-10-CM

## 2024-01-26 DIAGNOSIS — G89.29 CHRONIC PAIN OF LEFT KNEE: Primary | ICD-10-CM

## 2024-01-26 PROCEDURE — 97530 THERAPEUTIC ACTIVITIES: CPT | Performed by: PHYSICAL THERAPIST

## 2024-01-26 PROCEDURE — 97110 THERAPEUTIC EXERCISES: CPT | Performed by: PHYSICAL THERAPIST

## 2024-01-31 NOTE — PROGRESS NOTES
Physical Therapy Daily Treatment Note    [facility]   [address], [address] [place] [address]      Patient: Lorenzo Cortes   : 1990  Referring practitioner: Deven Muñoz MD  Date of Initial Visit: Type: THERAPY  Noted: 2023  Today's Date: 2024  Patient seen for 10 sessions    Certification Period 2024 thru 2024       Visit Diagnoses:    ICD-10-CM ICD-9-CM   1. Chronic pain of left knee  M25.562 719.46    G89.29 338.29       Subjective     Pt states that he feels like he is doing very well and he has been able to increase exercise intensity and walking distance without increasing pain in the knee.     Objective   See Exercise, Manual, and Modality Logs for complete treatment.       Assessment/Plan     Pt has made very good progress with therapy and he demonstrates excellent strength and tolerance to activity at this time. Pt was given an updated HEP and he is appropriate for DC at this time.       Timed:         Manual Therapy:         mins  93095;     Therapeutic Exercise:    13     mins  90419;     Neuromuscular Anders:        mins  59319;    Therapeutic Activity:     40     mins  19376;     Gait Training:           mins  42671;     Ultrasound:          mins  99471;    Ionto                                   mins   27067  Self Care                            mins   83503  Canalith Repos         mins 66776  Electrical Stimulation:         mins  12684    Un-Timed:  Electrical Stimulation:         mins  07228 ( );  Dry Needling          mins self-pay  Traction          mins 30798      Timed Treatment:   53   mins   Total Treatment:     53   mins    Edin Watson, PT, DPT, OCS, Cert. DN  [place] License: [id number]

## 2024-02-06 ENCOUNTER — OFFICE VISIT (OUTPATIENT)
Dept: INTERNAL MEDICINE | Facility: CLINIC | Age: 34
End: 2024-02-06
Payer: COMMERCIAL

## 2024-02-06 VITALS
TEMPERATURE: 96.9 F | SYSTOLIC BLOOD PRESSURE: 136 MMHG | RESPIRATION RATE: 18 BRPM | WEIGHT: 252.8 LBS | DIASTOLIC BLOOD PRESSURE: 78 MMHG | BODY MASS INDEX: 35 KG/M2 | HEART RATE: 74 BPM

## 2024-02-06 DIAGNOSIS — S29.011A INTERCOSTAL MUSCLE STRAIN, INITIAL ENCOUNTER: Primary | ICD-10-CM

## 2024-02-06 PROBLEM — S83.412A SPRAIN OF MEDIAL COLLATERAL LIGAMENT OF LEFT KNEE: Status: RESOLVED | Noted: 2023-09-05 | Resolved: 2024-02-06

## 2024-02-06 PROCEDURE — 99213 OFFICE O/P EST LOW 20 MIN: CPT | Performed by: STUDENT IN AN ORGANIZED HEALTH CARE EDUCATION/TRAINING PROGRAM

## 2024-02-06 RX ORDER — CYCLOBENZAPRINE HYDROCHLORIDE 7.5 MG/1
7.5 TABLET, FILM COATED ORAL NIGHTLY PRN
Qty: 7 TABLET | Refills: 0 | Status: SHIPPED | OUTPATIENT
Start: 2024-02-06

## 2024-02-06 NOTE — PROGRESS NOTES
Acute Office Visit      Date: 2024   Patient Name: Lorenzo Cortes  : 1990   MRN: [id number]     Chief Complaint:    Chief Complaint   Patient presents with    Flank Pain     Lt after sneezing        History of Present Illness: Lorenzo Cortes is a 33 y.o. male.    The patient presents for evaluation of left rib pain.    Left rib pain  He was locking doors and turning off lights to go to bed last night, 2024, and sneezed and experienced pain from the bottom of his left rib cage down. He is inquiring about how to rule out a hernia that needs surgery. He has been resting and icing the area. He denies seeing any hematochezia. He denies having a bowel movement like he normally does when he woke up this morning. His main points of pain come from coughing and sneezing; however, it is anything that makes the core tighten up that is what causes it. He can lay down if he over lengthens his back to relieve the pain; however, when he comes back up, the pain occurs. He denies any trauma to the area. He takes ibuprofen. DAXHPI      Subjective      Review of Systems:   Review of Systems   All other systems reviewed and are negative.      I have reviewed the patients family history, social history, past medical history, past surgical history and have updated it as appropriate.     Medications:     Current Outpatient Medications:     azelastine (ASTELIN) 0.1 % nasal spray, Every 12 (Twelve) Hours., Disp: , Rfl:     fexofenadine-pseudoephedrine (ALLEGRA-D 24) 180-240 MG per 24 hr tablet, Take 1 tablet by mouth Daily As Needed for Allergies or Rhinitis., Disp: 20 tablet, Rfl: 0    fluticasone (FLONASE) 50 MCG/ACT nasal spray, fluticasone propionate 50 mcg/actuation nasal spray,suspension  Spray 1 spray every day by intranasal route., Disp: , Rfl:     ibuprofen (ADVIL,MOTRIN) 800 MG tablet, Take 1 tablet by mouth 3 (Three) Times a Day., Disp: 90 tablet, Rfl: 0    ondansetron (Zofran) 4 MG tablet, Take 1 tablet by  mouth Every 8 (Eight) Hours As Needed for Nausea., Disp: 10 tablet, Rfl: 1    sildenafil (VIAGRA) 50 MG tablet, sildenafil 50 mg tablet  Take 1 tablet every day by oral route as needed., Disp: , Rfl:     cyclobenzaprine (FEXMID) 7.5 MG tablet, Take 1 tablet by mouth At Night As Needed for Muscle Spasms., Disp: 7 tablet, Rfl: 0    Allergies:   Allergies   Allergen Reactions    Amoxicillin Hives    Azithromycin Hives    Cefaclor Hives       Objective     Physical Exam: Please see above  Vital Signs:   Vitals:    02/06/24 0818   BP: 136/78   BP Location: Right arm   Patient Position: Sitting   Cuff Size: Adult   Pulse: 74   Resp: 18   Temp: 96.9 °F (36.1 °C)   TempSrc: Temporal   Weight: 115 kg (252 lb 12.8 oz)   PainSc: 10-Worst pain ever     Body mass index is 35 kg/m².    Physical Exam  Vitals and nursing note reviewed.   Constitutional:       General: He is not in acute distress.     Appearance: Normal appearance. He is normal weight. He is not ill-appearing or toxic-appearing.   HENT:      Nose: No congestion or rhinorrhea.   Eyes:      General:         Right eye: No discharge.         Left eye: No discharge.      Conjunctiva/sclera: Conjunctivae normal.   Pulmonary:      Effort: Pulmonary effort is normal. No respiratory distress.   Abdominal:      General: Abdomen is flat. There is no distension.   Musculoskeletal:         General: Tenderness (palpation along the inferior ribs along the left lateral side) present. No swelling or deformity.      Cervical back: Normal range of motion.   Skin:     Coloration: Skin is not jaundiced.      Findings: No bruising, lesion or rash.   Neurological:      General: No focal deficit present.      Mental Status: He is alert. Mental status is at baseline.      Coordination: Coordination normal.      Gait: Gait normal.   Psychiatric:         Mood and Affect: Mood normal.         Behavior: Behavior normal.         Thought Content: Thought content normal.         Judgment: Judgment  "normal.         Procedures    Results:   Labs:   No results found for: \"HGBA1C\", \"CMP\", \"CBCDIFFPANEL\", \"CREAT\", \"TSH\"     Imaging:   No valid procedures specified.     Assessment / Plan      Assessment/Plan:   Problem List Items Addressed This Visit       Intercostal muscle strain - Primary    Relevant Medications    cyclobenzaprine (FEXMID) 7.5 MG tablet       1. Intercostal strain.  * He has significant point tenderness.   * I will prescribe a muscle relaxer to take nightly for 1 week.  * He can take ibuprofen every 6 hours.   * He can also use over-the-counter lidocaine patches.   * He was advised to use an incentive spirometer.   * If his symptoms do not improve in a couple of weeks, we will consider additional imaging.    The patient will follow up in 1 month    Follow Up:   Return in about 4 weeks (around 3/5/2024) for Recheck.          Deshaun Person MD  [facility]    Transcribed from ambient dictation for Deshaun Person MD by Bella Patel.  02/06/24   09:57 EST    Patient or patient representative verbalized consent to the visit recording.  I have personally performed the services described in this document as transcribed by the above individual, and it is both accurate and complete.    "

## 2024-02-12 ENCOUNTER — LAB (OUTPATIENT)
Dept: INTERNAL MEDICINE | Facility: CLINIC | Age: 34
End: 2024-02-12
Payer: COMMERCIAL

## 2024-02-12 DIAGNOSIS — Z13.6 SCREENING FOR ISCHEMIC HEART DISEASE: ICD-10-CM

## 2024-02-12 DIAGNOSIS — Z00.00 ROUTINE HEALTH MAINTENANCE: ICD-10-CM

## 2024-02-12 DIAGNOSIS — E66.09 CLASS 1 OBESITY DUE TO EXCESS CALORIES WITHOUT SERIOUS COMORBIDITY WITH BODY MASS INDEX (BMI) OF 34.0 TO 34.9 IN ADULT: ICD-10-CM

## 2024-02-12 LAB
ALBUMIN SERPL-MCNC: 4.5 G/DL (ref 3.5–5.2)
ALBUMIN/GLOB SERPL: 1.7 G/DL
ALP SERPL-CCNC: 94 U/L (ref 39–117)
ALT SERPL W P-5'-P-CCNC: 50 U/L (ref 1–41)
ANION GAP SERPL CALCULATED.3IONS-SCNC: 10.5 MMOL/L (ref 5–15)
AST SERPL-CCNC: 23 U/L (ref 1–40)
BASOPHILS # BLD AUTO: 0.03 10*3/MM3 (ref 0–0.2)
BASOPHILS NFR BLD AUTO: 0.4 % (ref 0–1.5)
BILIRUB SERPL-MCNC: 1.3 MG/DL (ref 0–1.2)
BUN SERPL-MCNC: 9 MG/DL (ref 6–20)
BUN/CREAT SERPL: 8.7 (ref 7–25)
CALCIUM SPEC-SCNC: 9.5 MG/DL (ref 8.6–10.5)
CHLORIDE SERPL-SCNC: 104 MMOL/L (ref 98–107)
CHOLEST SERPL-MCNC: 167 MG/DL (ref 0–200)
CO2 SERPL-SCNC: 26.5 MMOL/L (ref 22–29)
CREAT SERPL-MCNC: 1.03 MG/DL (ref 0.76–1.27)
DEPRECATED RDW RBC AUTO: 38.8 FL (ref 37–54)
EGFRCR SERPLBLD CKD-EPI 2021: 98.4 ML/MIN/1.73
EOSINOPHIL # BLD AUTO: 0.15 10*3/MM3 (ref 0–0.4)
EOSINOPHIL NFR BLD AUTO: 1.8 % (ref 0.3–6.2)
ERYTHROCYTE [DISTWIDTH] IN BLOOD BY AUTOMATED COUNT: 12.6 % (ref 12.3–15.4)
GLOBULIN UR ELPH-MCNC: 2.7 GM/DL
GLUCOSE SERPL-MCNC: 80 MG/DL (ref 65–99)
HBA1C MFR BLD: 5.1 % (ref 4.8–5.6)
HCT VFR BLD AUTO: 45.6 % (ref 37.5–51)
HDLC SERPL-MCNC: 25 MG/DL (ref 40–60)
HGB BLD-MCNC: 16 G/DL (ref 13–17.7)
IMM GRANULOCYTES # BLD AUTO: 0.03 10*3/MM3 (ref 0–0.05)
IMM GRANULOCYTES NFR BLD AUTO: 0.4 % (ref 0–0.5)
LDLC SERPL CALC-MCNC: 111 MG/DL (ref 0–100)
LDLC/HDLC SERPL: 4.3 {RATIO}
LYMPHOCYTES # BLD AUTO: 2.67 10*3/MM3 (ref 0.7–3.1)
LYMPHOCYTES NFR BLD AUTO: 31.4 % (ref 19.6–45.3)
MCH RBC QN AUTO: 30.2 PG (ref 26.6–33)
MCHC RBC AUTO-ENTMCNC: 35.1 G/DL (ref 31.5–35.7)
MCV RBC AUTO: 86 FL (ref 79–97)
MONOCYTES # BLD AUTO: 0.59 10*3/MM3 (ref 0.1–0.9)
MONOCYTES NFR BLD AUTO: 6.9 % (ref 5–12)
NEUTROPHILS NFR BLD AUTO: 5.03 10*3/MM3 (ref 1.7–7)
NEUTROPHILS NFR BLD AUTO: 59.1 % (ref 42.7–76)
NRBC BLD AUTO-RTO: 0 /100 WBC (ref 0–0.2)
PLATELET # BLD AUTO: 260 10*3/MM3 (ref 140–450)
PMV BLD AUTO: 11.9 FL (ref 6–12)
POTASSIUM SERPL-SCNC: 4.2 MMOL/L (ref 3.5–5.2)
PROT SERPL-MCNC: 7.2 G/DL (ref 6–8.5)
RBC # BLD AUTO: 5.3 10*6/MM3 (ref 4.14–5.8)
SODIUM SERPL-SCNC: 141 MMOL/L (ref 136–145)
TRIGL SERPL-MCNC: 173 MG/DL (ref 0–150)
VLDLC SERPL-MCNC: 31 MG/DL (ref 5–40)
WBC NRBC COR # BLD AUTO: 8.5 10*3/MM3 (ref 3.4–10.8)

## 2024-02-12 PROCEDURE — 80053 COMPREHEN METABOLIC PANEL: CPT | Performed by: STUDENT IN AN ORGANIZED HEALTH CARE EDUCATION/TRAINING PROGRAM

## 2024-02-12 PROCEDURE — 83036 HEMOGLOBIN GLYCOSYLATED A1C: CPT | Performed by: STUDENT IN AN ORGANIZED HEALTH CARE EDUCATION/TRAINING PROGRAM

## 2024-02-12 PROCEDURE — 85025 COMPLETE CBC W/AUTO DIFF WBC: CPT | Performed by: STUDENT IN AN ORGANIZED HEALTH CARE EDUCATION/TRAINING PROGRAM

## 2024-02-12 PROCEDURE — 80061 LIPID PANEL: CPT | Performed by: STUDENT IN AN ORGANIZED HEALTH CARE EDUCATION/TRAINING PROGRAM

## 2024-09-20 ENCOUNTER — OFFICE VISIT (OUTPATIENT)
Dept: INTERNAL MEDICINE | Facility: CLINIC | Age: 34
End: 2024-09-20
Payer: COMMERCIAL

## 2024-09-20 VITALS
HEART RATE: 78 BPM | BODY MASS INDEX: 35.56 KG/M2 | DIASTOLIC BLOOD PRESSURE: 82 MMHG | HEIGHT: 71 IN | SYSTOLIC BLOOD PRESSURE: 120 MMHG | RESPIRATION RATE: 18 BRPM | TEMPERATURE: 97.8 F | WEIGHT: 254 LBS

## 2024-09-20 DIAGNOSIS — M54.50 CHRONIC BILATERAL LOW BACK PAIN WITHOUT SCIATICA: Primary | ICD-10-CM

## 2024-09-20 DIAGNOSIS — Z98.890 S/P ARTHROSCOPIC PARTIAL MEDIAL MENISCECTOMY OF LEFT KNEE: ICD-10-CM

## 2024-09-20 DIAGNOSIS — Z87.828 S/P ARTHROSCOPIC PARTIAL MEDIAL MENISCECTOMY OF LEFT KNEE: ICD-10-CM

## 2024-09-20 DIAGNOSIS — G89.29 CHRONIC BILATERAL LOW BACK PAIN WITHOUT SCIATICA: Primary | ICD-10-CM

## 2024-09-20 DIAGNOSIS — I10 PRIMARY HYPERTENSION: ICD-10-CM

## 2024-09-20 PROCEDURE — 99213 OFFICE O/P EST LOW 20 MIN: CPT

## 2024-10-15 ENCOUNTER — TREATMENT (OUTPATIENT)
Dept: PHYSICAL THERAPY | Facility: CLINIC | Age: 34
End: 2024-10-15
Payer: COMMERCIAL

## 2024-10-15 DIAGNOSIS — G89.29 CHRONIC LOW BACK PAIN, UNSPECIFIED BACK PAIN LATERALITY, UNSPECIFIED WHETHER SCIATICA PRESENT: Primary | ICD-10-CM

## 2024-10-15 DIAGNOSIS — M54.50 CHRONIC LOW BACK PAIN, UNSPECIFIED BACK PAIN LATERALITY, UNSPECIFIED WHETHER SCIATICA PRESENT: Primary | ICD-10-CM

## 2024-10-15 DIAGNOSIS — M25.562 CHRONIC PAIN OF LEFT KNEE: ICD-10-CM

## 2024-10-15 DIAGNOSIS — G89.29 CHRONIC PAIN OF LEFT KNEE: ICD-10-CM
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
## 2024-10-24 ENCOUNTER — TREATMENT (OUTPATIENT)
Dept: PHYSICAL THERAPY | Facility: CLINIC | Age: 34
End: 2024-10-24
Payer: COMMERCIAL

## 2024-10-24 DIAGNOSIS — G89.29 CHRONIC PAIN OF LEFT KNEE: ICD-10-CM

## 2024-10-24 DIAGNOSIS — M54.50 CHRONIC LOW BACK PAIN, UNSPECIFIED BACK PAIN LATERALITY, UNSPECIFIED WHETHER SCIATICA PRESENT: Primary | ICD-10-CM

## 2024-10-24 DIAGNOSIS — G89.29 CHRONIC LOW BACK PAIN, UNSPECIFIED BACK PAIN LATERALITY, UNSPECIFIED WHETHER SCIATICA PRESENT: Primary | ICD-10-CM

## 2024-10-24 DIAGNOSIS — M25.562 CHRONIC PAIN OF LEFT KNEE: ICD-10-CM

## 2024-10-24 PROCEDURE — 97110 THERAPEUTIC EXERCISES: CPT | Performed by: PHYSICAL THERAPIST

## 2024-10-24 PROCEDURE — 97140 MANUAL THERAPY 1/> REGIONS: CPT | Performed by: PHYSICAL THERAPIST

## 2024-10-24 PROCEDURE — 97112 NEUROMUSCULAR REEDUCATION: CPT | Performed by: PHYSICAL THERAPIST

## 2024-10-29 ENCOUNTER — TREATMENT (OUTPATIENT)
Dept: PHYSICAL THERAPY | Facility: CLINIC | Age: 34
End: 2024-10-29
Payer: COMMERCIAL

## 2024-10-29 DIAGNOSIS — M54.50 CHRONIC LOW BACK PAIN, UNSPECIFIED BACK PAIN LATERALITY, UNSPECIFIED WHETHER SCIATICA PRESENT: Primary | ICD-10-CM

## 2024-10-29 DIAGNOSIS — G89.29 CHRONIC LOW BACK PAIN, UNSPECIFIED BACK PAIN LATERALITY, UNSPECIFIED WHETHER SCIATICA PRESENT: Primary | ICD-10-CM

## 2024-10-29 DIAGNOSIS — M25.562 CHRONIC PAIN OF LEFT KNEE: ICD-10-CM

## 2024-10-29 DIAGNOSIS — G89.29 CHRONIC PAIN OF LEFT KNEE: ICD-10-CM

## 2024-10-31 NOTE — PROGRESS NOTES
Physical Therapy Daily Treatment Note    [facility]   [address], [address] [place] [address]      Patient: Lorenzo Cortes   : 1990  Referring practitioner: AIMEE Brock  Date of Initial Visit: Type: THERAPY  Noted: 10/15/2024  Today's Date: 10/31/2024  Patient seen for 3 sessions    Certification Period 10/31/2024 thru 2025       Visit Diagnoses:    ICD-10-CM ICD-9-CM   1. Chronic low back pain, unspecified back pain laterality, unspecified whether sciatica present  M54.50 724.2    G89.29 338.29   2. Chronic pain of left knee  M25.562 719.46    G89.29 338.29       Subjective     Patient notes he feels like he is doing well so far with therapy and he typically feels a decrease in his low back pain and increase in stiffness for several days after his therapy sessions.  Patient reports compliance with his home exercise program.    Objective   See Exercise, Manual, and Modality Logs for complete treatment.       Assessment/Plan     Progress with therapy and H&H improvement in his ability to form resisted activity in the clinic.  Will continue to progress activity as indicated with focus on improving lumbopelvic stability and endurance.    Lorenzo Cortes will continue to benefit from skilled physical therapy services to address deficits and continue to work towards reaching functional goals.           Timed:         Manual Therapy:    10     mins  72456;     Therapeutic Exercise:    14     mins  43224;     Neuromuscular Anders:    15    mins  75724;    Therapeutic Activity:     15     mins  39374;     Gait Training:           mins  31214;     Ultrasound:          mins  08771;    Ionto                                   mins   62423  Self Care                            mins   08379  Canalith Repos         mins 06927  Electrical Stimulation:         mins  01690    Un-Timed:  Electrical Stimulation:         mins  11623 ( );  Dry Needling          mins self-pay  Traction          mins  08087      Timed Treatment:   54   mins   Total Treatment:     54   mins    Edin Watson PT, DPT, Cert. DN  [place] License: [id number]

## 2024-10-31 NOTE — PROGRESS NOTES
Physical Therapy Daily Treatment Note    [facility]   [address], [address] [place] [address]      Patient: Lorenzo Cortes   : 1990  Referring practitioner: AIMEE Brock  Date of Initial Visit: Type: THERAPY  Noted: 10/15/2024  Today's Date: 10/30/2024  Patient seen for 2 sessions    Certification Period 10/30/2024 thru 2025       Visit Diagnoses:    ICD-10-CM ICD-9-CM   1. Chronic low back pain, unspecified back pain laterality, unspecified whether sciatica present  M54.50 724.2    G89.29 338.29   2. Chronic pain of left knee  M25.562 719.46    G89.29 338.29       Subjective     Patient states that he feels like he is doing fairly well overall and he has not had any significant exacerbation of his left knee pain.  He feels some mild soreness in the low back and some intermittent stiffness as well.    Objective   See Exercise, Manual, and Modality Logs for complete treatment.       Assessment/Plan     Patient was able to progress exercise in the clinic today without having exacerbation of symptoms.  We able to add resisted activity without fatigue.  Will continue to progress activity as indicated.    Lorenzo Cortes will continue to benefit from skilled physical therapy services to address deficits and continue to work towards reaching functional goals.           Timed:         Manual Therapy:    10     mins  06286;     Therapeutic Exercise:    14     mins  00044;     Neuromuscular Anders:    30    mins  33209;    Therapeutic Activity:          mins  38066;     Gait Training:           mins  28156;     Ultrasound:          mins  93111;    Ionto                                   mins   67457  Self Care                            mins   27452  Canalith Repos         mins 98367  Electrical Stimulation:         mins  99074    Un-Timed:  Electrical Stimulation:         mins  47301 ( );  Dry Needling          mins self-pay  Traction          mins 02533      Timed Treatment:   54   mins    Total Treatment:     54   mins    Edin Watson, PT, DPT, Cert. DN  [place] License: [id number]

## 2024-11-14 ENCOUNTER — TREATMENT (OUTPATIENT)
Dept: PHYSICAL THERAPY | Facility: CLINIC | Age: 34
End: 2024-11-14
Payer: COMMERCIAL

## 2024-11-14 DIAGNOSIS — G89.29 CHRONIC PAIN OF LEFT KNEE: ICD-10-CM

## 2024-11-14 DIAGNOSIS — G89.29 CHRONIC LOW BACK PAIN, UNSPECIFIED BACK PAIN LATERALITY, UNSPECIFIED WHETHER SCIATICA PRESENT: Primary | ICD-10-CM

## 2024-11-14 DIAGNOSIS — M54.50 CHRONIC LOW BACK PAIN, UNSPECIFIED BACK PAIN LATERALITY, UNSPECIFIED WHETHER SCIATICA PRESENT: Primary | ICD-10-CM

## 2024-11-14 DIAGNOSIS — M25.562 CHRONIC PAIN OF LEFT KNEE: ICD-10-CM

## 2024-11-14 NOTE — PROGRESS NOTES
Physical Therapy Daily Treatment Note    [facility]   [address], [address] [place] [address]      Patient: Lorenzo Cortes   : 1990  Referring practitioner: AIMEE Brock  Date of Initial Visit: Type: THERAPY  Noted: 10/15/2024  Today's Date: 2024  Patient seen for 4 sessions    Certification Period 2024 thru 2025       Visit Diagnoses:    ICD-10-CM ICD-9-CM   1. Chronic low back pain, unspecified back pain laterality, unspecified whether sciatica present  M54.50 724.2    G89.29 338.29   2. Chronic pain of left knee  M25.562 719.46    G89.29 338.29       Subjective     Pt states that he feels like he is still making improvement, but he twisted his knee recently and he has had some slight increase in pain as a result. He feels like he is mostly back to normal now.     Objective   See Exercise, Manual, and Modality Logs for complete treatment.       Assessment/Plan     Worked on modifications for stretching to decrease the pain in his left knee while stretching for the low back. Progressed intensity of hip and core strengthening in the clinic today. Will cont to progress as indicated.      Lorenzo Cortes will continue to benefit from skilled physical therapy services to address deficits and continue to work towards reaching functional goals.           Timed:         Manual Therapy:         mins  16678;     Therapeutic Exercise:    14     mins  99446;     Neuromuscular Anders:    15    mins  82496;    Therapeutic Activity:     30     mins  79056;     Gait Training:           mins  42836;     Ultrasound:          mins  91646;    Ionto                                   mins   96077  Self Care                            mins   95207  Canalith Repos         mins 60574  Electrical Stimulation:         mins  73144    Un-Timed:  Electrical Stimulation:         mins  91377 ( );  Dry Needling          mins self-pay  Traction          mins 42328      Timed Treatment:   59   mins    Total Treatment:     59   mins    Edin Watson, PT, DPT, Cert. DN  [place] License: [id number]

## 2024-11-25 ENCOUNTER — TREATMENT (OUTPATIENT)
Dept: PHYSICAL THERAPY | Facility: CLINIC | Age: 34
End: 2024-11-25
Payer: COMMERCIAL

## 2024-11-25 DIAGNOSIS — M25.562 CHRONIC PAIN OF LEFT KNEE: ICD-10-CM

## 2024-11-25 DIAGNOSIS — M54.50 CHRONIC LOW BACK PAIN, UNSPECIFIED BACK PAIN LATERALITY, UNSPECIFIED WHETHER SCIATICA PRESENT: Primary | ICD-10-CM

## 2024-11-25 DIAGNOSIS — G89.29 CHRONIC PAIN OF LEFT KNEE: ICD-10-CM

## 2024-11-25 DIAGNOSIS — G89.29 CHRONIC LOW BACK PAIN, UNSPECIFIED BACK PAIN LATERALITY, UNSPECIFIED WHETHER SCIATICA PRESENT: Primary | ICD-10-CM

## 2024-11-29 NOTE — PROGRESS NOTES
Physical Therapy Daily Treatment Note    [facility]   [address], [address] [place] [address]      Patient: Lorenzo Cortes   : 1990  Referring practitioner: AIMEE Brock  Date of Initial Visit: Type: THERAPY  Noted: 10/15/2024  Today's Date: 2024  Patient seen for 5 sessions    Certification Period 2024 thru 2025       Visit Diagnoses:    ICD-10-CM ICD-9-CM   1. Chronic low back pain, unspecified back pain laterality, unspecified whether sciatica present  M54.50 724.2    G89.29 338.29   2. Chronic pain of left knee  M25.562 719.46    G89.29 338.29       Subjective     Pt states that he feels like he is doing well and he has had less discomfort overall, but he has had some recent exacerbation of symptoms int he knee and low back, wihtout any specific mechanism     Objective   See Exercise, Manual, and Modality Logs for complete treatment.       Assessment/Plan     Pt is making steady progress and he demonstrates an improvement in tolerance to activity in the clinic. Will cont to progress as indicated.      Lorenzo Cortes will continue to benefit from skilled physical therapy services to address deficits and continue to work towards reaching functional goals.           Timed:         Manual Therapy:    10     mins  60739;     Therapeutic Exercise:    14     mins  26109;     Neuromuscular Anders:    30    mins  08905;    Therapeutic Activity:          mins  30386;     Gait Training:           mins  77005;     Ultrasound:          mins  37514;    Ionto                                   mins   46574  Self Care                            mins   65288  Canalith Repos         mins 37994  Electrical Stimulation:         mins  28767    Un-Timed:  Electrical Stimulation:         mins  81954 ( );  Dry Needling          mins self-pay  Traction          mins 89916      Timed Treatment:   54   mins   Total Treatment:     54   mins    Edin Watson, PT, DPT, Cert. DN  [place] License:  [id number]

## 2024-12-02 ENCOUNTER — TREATMENT (OUTPATIENT)
Dept: PHYSICAL THERAPY | Facility: CLINIC | Age: 34
End: 2024-12-02
Payer: COMMERCIAL

## 2024-12-02 DIAGNOSIS — G89.29 CHRONIC LOW BACK PAIN, UNSPECIFIED BACK PAIN LATERALITY, UNSPECIFIED WHETHER SCIATICA PRESENT: Primary | ICD-10-CM

## 2024-12-02 DIAGNOSIS — M54.50 CHRONIC LOW BACK PAIN, UNSPECIFIED BACK PAIN LATERALITY, UNSPECIFIED WHETHER SCIATICA PRESENT: Primary | ICD-10-CM

## 2024-12-02 DIAGNOSIS — M25.562 CHRONIC PAIN OF LEFT KNEE: ICD-10-CM

## 2024-12-02 DIAGNOSIS — G89.29 CHRONIC PAIN OF LEFT KNEE: ICD-10-CM

## 2024-12-02 PROCEDURE — 97112 NEUROMUSCULAR REEDUCATION: CPT | Performed by: PHYSICAL THERAPIST

## 2024-12-02 PROCEDURE — 97110 THERAPEUTIC EXERCISES: CPT | Performed by: PHYSICAL THERAPIST

## 2024-12-02 PROCEDURE — 97530 THERAPEUTIC ACTIVITIES: CPT | Performed by: PHYSICAL THERAPIST

## 2024-12-02 NOTE — PROGRESS NOTES
Physical Therapy Daily Treatment Note    [facility]   [address], [address] [place] [address]      Patient: Lorenzo Cortes   : 1990  Referring practitioner: AIMEE Brock  Date of Initial Visit: Type: THERAPY  Noted: 10/15/2024  Today's Date: 2024  Patient seen for 6 sessions    Certification Period 2024 thru 3/1/2025       Visit Diagnoses:    ICD-10-CM ICD-9-CM   1. Chronic low back pain, unspecified back pain laterality, unspecified whether sciatica present  M54.50 724.2    G89.29 338.29   2. Chronic pain of left knee  M25.562 719.46    G89.29 338.29       Subjective     Pt states that he feels like he is doing well so far and he denies having any significant increase in pain with therapy so far. He had has some muscle tightness in the right hip, but this has not caused any limitation with activity.     Objective   See Exercise, Manual, and Modality Logs for complete treatment.       Assessment/Plan     Pt had some increase in pain in the anterior right hip with exercise today, but he responded well to stretching and to modified exercise in the clinic. Will cont to progress as indicated.      Lorenzo Cortes will continue to benefit from skilled physical therapy services to address deficits and continue to work towards reaching functional goals.           Timed:         Manual Therapy:         mins  32938;     Therapeutic Exercise:    14     mins  60207;     Neuromuscular Anders:    12    mins  38017;    Therapeutic Activity:     30     mins  56213;     Gait Training:           mins  03249;     Ultrasound:          mins  23338;    Ionto                                   mins   88469  Self Care                            mins   94901  Canalith Repos         mins 07644  Electrical Stimulation:         mins  70160    Un-Timed:  Electrical Stimulation:         mins  45499 ( );  Dry Needling          mins self-pay  Traction          mins 63123      Timed Treatment:   56   mins    Total Treatment:     56   mins    Edin Watson, PT, DPT, Cert. DN  [place] License: [id number]

## 2024-12-10 ENCOUNTER — TELEPHONE (OUTPATIENT)
Dept: PHYSICAL THERAPY | Facility: CLINIC | Age: 34
End: 2024-12-10

## 2024-12-10 NOTE — TELEPHONE ENCOUNTER
Caller: Lorenzo Cortes    Relationship: Self       What was the call regarding: NOT FEELING WELL, LEFT VOICEMAIL

## 2025-01-02 ENCOUNTER — TREATMENT (OUTPATIENT)
Dept: PHYSICAL THERAPY | Facility: CLINIC | Age: 35
End: 2025-01-02
Payer: COMMERCIAL

## 2025-01-02 DIAGNOSIS — M54.50 CHRONIC LOW BACK PAIN, UNSPECIFIED BACK PAIN LATERALITY, UNSPECIFIED WHETHER SCIATICA PRESENT: Primary | ICD-10-CM

## 2025-01-02 DIAGNOSIS — M25.562 CHRONIC PAIN OF LEFT KNEE: ICD-10-CM

## 2025-01-02 DIAGNOSIS — G89.29 CHRONIC LOW BACK PAIN, UNSPECIFIED BACK PAIN LATERALITY, UNSPECIFIED WHETHER SCIATICA PRESENT: Primary | ICD-10-CM

## 2025-01-02 DIAGNOSIS — G89.29 CHRONIC PAIN OF LEFT KNEE: ICD-10-CM

## 2025-01-02 PROCEDURE — 97530 THERAPEUTIC ACTIVITIES: CPT | Performed by: PHYSICAL THERAPIST

## 2025-01-02 PROCEDURE — 97140 MANUAL THERAPY 1/> REGIONS: CPT | Performed by: PHYSICAL THERAPIST

## 2025-01-02 PROCEDURE — 97110 THERAPEUTIC EXERCISES: CPT | Performed by: PHYSICAL THERAPIST

## 2025-01-02 PROCEDURE — 97112 NEUROMUSCULAR REEDUCATION: CPT | Performed by: PHYSICAL THERAPIST

## 2025-01-07 NOTE — PROGRESS NOTES
Physical Therapy Daily Treatment Note    [facility]   [address], [address] [place] [address]      Patient: Lorenzo Cortes   : 1990  Referring practitioner: AIMEE Brock  Date of Initial Visit: Type: THERAPY  Noted: 10/15/2024  Today's Date: 2025  Patient seen for 7 sessions    Certification Period 2025 thru 2025       Visit Diagnoses:    ICD-10-CM ICD-9-CM   1. Chronic low back pain, unspecified back pain laterality, unspecified whether sciatica present  M54.50 724.2    G89.29 338.29   2. Chronic pain of left knee  M25.562 719.46    G89.29 338.29       Subjective     Pt states that his low back has been feeling better over the past several weeks and he has been able to maintain relief with stretching and exercise at home. He has recently had some increase in soreness in the left knee, but the pain has not been severe.     Objective   See Exercise, Manual, and Modality Logs for complete treatment.       Assessment/Plan     Hypertonicity and TTP noted at the medial aspect of the left quad in the area of the VMO. Mild TTP noted at the left pes anserine as well. Pt responded well to manual therapy and had a decrease in pain at the end of his session. Will cont to progress as indicated.      Lorenzo Cortes will continue to benefit from skilled physical therapy services to address deficits and continue to work towards reaching functional goals.           Timed:         Manual Therapy:    14     mins  48393;     Therapeutic Exercise:    15     mins  28122;     Neuromuscular Anders:    14    mins  09816;    Therapeutic Activity:     12     mins  57979;     Gait Training:           mins  95748;     Ultrasound:          mins  41200;    Ionto                                   mins   27351  Self Care                            mins   61611  Canalith Repos         mins 11376  Electrical Stimulation:         mins  27781    Un-Timed:  Electrical Stimulation:         mins  99444 ( );  Dry  Needling          mins self-pay  Traction          mins 96679      Timed Treatment:   55   mins   Total Treatment:     55   mins    Edin Watson PT, DPT, Cert. DN  [place] License: [id number]

## 2025-06-19 ENCOUNTER — TELEPHONE (OUTPATIENT)
Dept: INTERNAL MEDICINE | Facility: CLINIC | Age: 35
End: 2025-06-19
Payer: COMMERCIAL

## 2025-06-19 ENCOUNTER — DOCUMENTATION (OUTPATIENT)
Dept: INTERNAL MEDICINE | Facility: CLINIC | Age: 35
End: 2025-06-19
Payer: COMMERCIAL

## 2025-06-19 NOTE — LETTER
June 19, 2025    Lorenzo Cortes  [address] [address]      To Whom it May Concern,    I am writing to provide documentation of medical necessity for Lorenzo Cortes ([id number]) to utilize cold plunge and sauna therapy as part of his treatment plan for chronic knee pain. Mr. Cortes has been under my care for ongoing knee issues following prior meniscal injury with surgical repair, which have significantly impacted his quality of life and daily functioning.     I recommend the use of cold plunge and sauna therapy as adjunctive treatments to help manage his symptoms.    Rationale for Treatment:    Cold Plunge Therapy: The application of cold therapy can help reduce inflammation and swelling in the knee joint, providing pain relief and improving mobility. This is particularly beneficial for patients with chronic pain conditions who experience flare-ups of inflammation.    Sauna Therapy: Heat therapy, such as that provided by a sauna, can promote muscle relaxation, increase circulation, and reduce joint stiffness. This can be especially helpful in managing chronic pain and improving joint function.    Both therapies are non-invasive and have been shown to complement traditional treatment modalities effectively. Incorporating these therapies into Mr. Cortes's treatment regimen is expected to enhance his pain management and overall rehabilitation process.    Given the chronic nature of Mr. Cortes's condition and the potential benefits of these therapies, I believe that cold plunge and sauna therapy are medically necessary for his ongoing treatment. I kindly request that these therapies be considered eligible for coverage under his Health Savings Account (HSA).    Please feel free to contact my office at [phone] should you require any further information or documentation to support this request.    Thank you for your attention to this matter.    Sincerely,    Chava Ramos,  MD  Medicine/Pediatrics  6/19/25

## 2025-06-19 NOTE — TELEPHONE ENCOUNTER
Caller: Lorenzo Cortes    Relationship: Self    Best call back number: [phone]     What is the best time to reach you: ANYTIME     Who are you requesting to speak with (clinical staff, provider,  specific staff member): CLINICAL STAFF    What was the call regarding: PATIENT IS CALLING TO SEE IF THE OFFICE PROVIDES LETTERS OF MEDICAL NECESSITY FOR COLD PLUNGES AND SAUNAS. HE SAYS THAT HE HAS BACK TROUBLES AND HAD MENISCUS SURGERY A WHILE BACK AND THINKS THIS WOULD HELP HIM. HE IS HOPING TO BE ABLE TO AFFORD THIS WITH HIS HSA.     Is it okay if the provider responds through MyChart: YES